# Patient Record
Sex: FEMALE | Race: WHITE | Employment: FULL TIME | URBAN - METROPOLITAN AREA
[De-identification: names, ages, dates, MRNs, and addresses within clinical notes are randomized per-mention and may not be internally consistent; named-entity substitution may affect disease eponyms.]

---

## 2018-12-13 ENCOUNTER — HOSPITAL ENCOUNTER (OUTPATIENT)
Dept: OCCUPATIONAL MEDICINE | Age: 38
Discharge: HOME | End: 2018-12-13

## 2018-12-13 DIAGNOSIS — T14.90XA INJURY: ICD-10-CM

## 2019-03-06 RX ORDER — SODIUM CHLORIDE 0.9 % (FLUSH) 0.9 %
5-40 SYRINGE (ML) INJECTION AS NEEDED
Status: CANCELLED | OUTPATIENT
Start: 2019-03-06

## 2019-03-06 RX ORDER — SODIUM CHLORIDE 0.9 % (FLUSH) 0.9 %
5-40 SYRINGE (ML) INJECTION EVERY 8 HOURS
Status: CANCELLED | OUTPATIENT
Start: 2019-03-06

## 2019-03-11 ENCOUNTER — ANESTHESIA EVENT (OUTPATIENT)
Dept: SURGERY | Age: 39
End: 2019-03-11
Payer: OTHER MISCELLANEOUS

## 2019-03-11 NOTE — H&P
CC: Pain of the right wrist    HPI:   The patient is a 43year old woman who works at [employer]. She has been seen through [facility]. The patient has had problems with the right elbow and wrist since around 12/1/18. The MRI scan shows some degenerative tearing of the triangular fibrocartilage complex and she has tenderness around the ulnar aspect of the wrist.  Her LT ligament was intact. She also had some degenerative change at the scapholunate ligament but no complete tear. She has had numbness and tingling into the ring and little fingers. Her previous nerve conduction studies show cubital tunnel syndrome. She is apparently being scheduled for a second opinion as I had recommended doing the cubital tunnel release and injecting the wrist.  She would like to have just the wrist injected today and see if that will relieve her pain. Procedure:  After an Avagard and alcohol prep, the ulnocarpal region of the left wrist was injected with 0.5 cc of 1% Lidocaine and 6 mg of Celestone uneventfully. The patient is going to be seen back again after her second opinion or if her pain recurs. I still recommend that she go ahead with the cubital tunnel release. She is going to continue with her current work restrictions.     WAB/kgw

## 2019-03-12 ENCOUNTER — ANESTHESIA (OUTPATIENT)
Dept: SURGERY | Age: 39
End: 2019-03-12
Payer: OTHER MISCELLANEOUS

## 2019-03-12 ENCOUNTER — HOSPITAL ENCOUNTER (OUTPATIENT)
Age: 39
Setting detail: OUTPATIENT SURGERY
Discharge: HOME | End: 2019-03-12
Attending: ORTHOPAEDIC SURGERY | Admitting: ORTHOPAEDIC SURGERY
Payer: OTHER MISCELLANEOUS

## 2019-03-12 VITALS
SYSTOLIC BLOOD PRESSURE: 118 MMHG | BODY MASS INDEX: 32.61 KG/M2 | RESPIRATION RATE: 16 BRPM | WEIGHT: 190 LBS | HEART RATE: 72 BPM | OXYGEN SATURATION: 100 % | TEMPERATURE: 98 F | DIASTOLIC BLOOD PRESSURE: 85 MMHG

## 2019-03-12 DIAGNOSIS — G89.18 POST-OP PAIN: Primary | ICD-10-CM

## 2019-03-12 PROCEDURE — 77030000032 HC CUF TRNQT ZIMM -B: Performed by: ORTHOPAEDIC SURGERY

## 2019-03-12 PROCEDURE — 77030002933 HC SUT MCRYL J&J -A: Performed by: ORTHOPAEDIC SURGERY

## 2019-03-12 PROCEDURE — 74011250636 HC RX REV CODE- 250/636: Performed by: ANESTHESIOLOGY

## 2019-03-12 PROCEDURE — 77030018836 HC SOL IRR NACL ICUM -A: Performed by: ORTHOPAEDIC SURGERY

## 2019-03-12 PROCEDURE — 74011000250 HC RX REV CODE- 250: Performed by: ORTHOPAEDIC SURGERY

## 2019-03-12 PROCEDURE — 76210000020 HC REC RM PH II FIRST 0.5 HR: Performed by: ORTHOPAEDIC SURGERY

## 2019-03-12 PROCEDURE — 74011250636 HC RX REV CODE- 250/636

## 2019-03-12 PROCEDURE — 76210000063 HC OR PH I REC FIRST 0.5 HR: Performed by: ORTHOPAEDIC SURGERY

## 2019-03-12 PROCEDURE — 74011250636 HC RX REV CODE- 250/636: Performed by: ORTHOPAEDIC SURGERY

## 2019-03-12 PROCEDURE — 76060000032 HC ANESTHESIA 0.5 TO 1 HR: Performed by: ORTHOPAEDIC SURGERY

## 2019-03-12 PROCEDURE — 76010000160 HC OR TIME 0.5 TO 1 HR INTENSV-TIER 1: Performed by: ORTHOPAEDIC SURGERY

## 2019-03-12 RX ORDER — LIDOCAINE HYDROCHLORIDE AND EPINEPHRINE 5; 5 MG/ML; UG/ML
INJECTION, SOLUTION INFILTRATION; PERINEURAL AS NEEDED
Status: DISCONTINUED | OUTPATIENT
Start: 2019-03-12 | End: 2019-03-12 | Stop reason: HOSPADM

## 2019-03-12 RX ORDER — LIDOCAINE HYDROCHLORIDE 20 MG/ML
INJECTION, SOLUTION EPIDURAL; INFILTRATION; INTRACAUDAL; PERINEURAL AS NEEDED
Status: DISCONTINUED | OUTPATIENT
Start: 2019-03-12 | End: 2019-03-12 | Stop reason: HOSPADM

## 2019-03-12 RX ORDER — FLUMAZENIL 0.1 MG/ML
0.2 INJECTION INTRAVENOUS AS NEEDED
Status: DISCONTINUED | OUTPATIENT
Start: 2019-03-12 | End: 2019-03-12 | Stop reason: HOSPADM

## 2019-03-12 RX ORDER — HYDROMORPHONE HYDROCHLORIDE 2 MG/ML
0.5 INJECTION, SOLUTION INTRAMUSCULAR; INTRAVENOUS; SUBCUTANEOUS
Status: DISCONTINUED | OUTPATIENT
Start: 2019-03-12 | End: 2019-03-12 | Stop reason: HOSPADM

## 2019-03-12 RX ORDER — NALOXONE HYDROCHLORIDE 0.4 MG/ML
0.1 INJECTION, SOLUTION INTRAMUSCULAR; INTRAVENOUS; SUBCUTANEOUS
Status: DISCONTINUED | OUTPATIENT
Start: 2019-03-12 | End: 2019-03-12 | Stop reason: HOSPADM

## 2019-03-12 RX ORDER — DIPHENHYDRAMINE HYDROCHLORIDE 50 MG/ML
12.5 INJECTION, SOLUTION INTRAMUSCULAR; INTRAVENOUS
Status: DISCONTINUED | OUTPATIENT
Start: 2019-03-12 | End: 2019-03-12 | Stop reason: HOSPADM

## 2019-03-12 RX ORDER — PROPOFOL 10 MG/ML
INJECTION, EMULSION INTRAVENOUS
Status: DISCONTINUED | OUTPATIENT
Start: 2019-03-12 | End: 2019-03-12 | Stop reason: HOSPADM

## 2019-03-12 RX ORDER — SODIUM CHLORIDE, SODIUM LACTATE, POTASSIUM CHLORIDE, CALCIUM CHLORIDE 600; 310; 30; 20 MG/100ML; MG/100ML; MG/100ML; MG/100ML
75 INJECTION, SOLUTION INTRAVENOUS CONTINUOUS
Status: DISCONTINUED | OUTPATIENT
Start: 2019-03-12 | End: 2019-03-12 | Stop reason: HOSPADM

## 2019-03-12 RX ORDER — LIDOCAINE HYDROCHLORIDE 5 MG/ML
INJECTION, SOLUTION INFILTRATION; INTRAVENOUS
Status: COMPLETED | OUTPATIENT
Start: 2019-03-12 | End: 2019-03-12

## 2019-03-12 RX ORDER — LIDOCAINE HYDROCHLORIDE 10 MG/ML
0.1 INJECTION INFILTRATION; PERINEURAL AS NEEDED
Status: DISCONTINUED | OUTPATIENT
Start: 2019-03-12 | End: 2019-03-12 | Stop reason: HOSPADM

## 2019-03-12 RX ORDER — SODIUM CHLORIDE 0.9 % (FLUSH) 0.9 %
5-40 SYRINGE (ML) INJECTION EVERY 8 HOURS
Status: DISCONTINUED | OUTPATIENT
Start: 2019-03-12 | End: 2019-03-12 | Stop reason: HOSPADM

## 2019-03-12 RX ORDER — SODIUM CHLORIDE 0.9 % (FLUSH) 0.9 %
5-40 SYRINGE (ML) INJECTION AS NEEDED
Status: DISCONTINUED | OUTPATIENT
Start: 2019-03-12 | End: 2019-03-12 | Stop reason: HOSPADM

## 2019-03-12 RX ORDER — HYDROCODONE BITARTRATE AND ACETAMINOPHEN 7.5; 325 MG/1; MG/1
1 TABLET ORAL
Qty: 15 TAB | Refills: 0 | Status: SHIPPED | OUTPATIENT
Start: 2019-03-12 | End: 2019-03-15

## 2019-03-12 RX ORDER — OXYCODONE HYDROCHLORIDE 5 MG/1
5 TABLET ORAL
Status: DISCONTINUED | OUTPATIENT
Start: 2019-03-12 | End: 2019-03-12 | Stop reason: HOSPADM

## 2019-03-12 RX ORDER — OXYCODONE HYDROCHLORIDE 5 MG/1
10 TABLET ORAL
Status: DISCONTINUED | OUTPATIENT
Start: 2019-03-12 | End: 2019-03-12 | Stop reason: HOSPADM

## 2019-03-12 RX ORDER — PROPOFOL 10 MG/ML
INJECTION, EMULSION INTRAVENOUS AS NEEDED
Status: DISCONTINUED | OUTPATIENT
Start: 2019-03-12 | End: 2019-03-12 | Stop reason: HOSPADM

## 2019-03-12 RX ORDER — MIDAZOLAM HYDROCHLORIDE 1 MG/ML
2 INJECTION, SOLUTION INTRAMUSCULAR; INTRAVENOUS
Status: COMPLETED | OUTPATIENT
Start: 2019-03-12 | End: 2019-03-12

## 2019-03-12 RX ORDER — CEFAZOLIN SODIUM/WATER 2 G/20 ML
2 SYRINGE (ML) INTRAVENOUS ONCE
Status: COMPLETED | OUTPATIENT
Start: 2019-03-12 | End: 2019-03-12

## 2019-03-12 RX ADMIN — SODIUM CHLORIDE, SODIUM LACTATE, POTASSIUM CHLORIDE, AND CALCIUM CHLORIDE 75 ML/HR: 600; 310; 30; 20 INJECTION, SOLUTION INTRAVENOUS at 10:30

## 2019-03-12 RX ADMIN — PROPOFOL 40 MG: 10 INJECTION, EMULSION INTRAVENOUS at 14:11

## 2019-03-12 RX ADMIN — PROPOFOL 140 MCG/KG/MIN: 10 INJECTION, EMULSION INTRAVENOUS at 14:00

## 2019-03-12 RX ADMIN — SODIUM CHLORIDE, SODIUM LACTATE, POTASSIUM CHLORIDE, AND CALCIUM CHLORIDE: 600; 310; 30; 20 INJECTION, SOLUTION INTRAVENOUS at 14:34

## 2019-03-12 RX ADMIN — LIDOCAINE HYDROCHLORIDE 60 MG: 20 INJECTION, SOLUTION EPIDURAL; INFILTRATION; INTRACAUDAL; PERINEURAL at 14:00

## 2019-03-12 RX ADMIN — LIDOCAINE HYDROCHLORIDE 50 ML: 5 INJECTION, SOLUTION INFILTRATION; INTRAVENOUS at 14:04

## 2019-03-12 RX ADMIN — PROPOFOL 50 MG: 10 INJECTION, EMULSION INTRAVENOUS at 14:00

## 2019-03-12 RX ADMIN — Medication 2 G: at 14:13

## 2019-03-12 RX ADMIN — MIDAZOLAM HYDROCHLORIDE 2 MG: 2 INJECTION, SOLUTION INTRAMUSCULAR; INTRAVENOUS at 11:39

## 2019-03-12 NOTE — DISCHARGE INSTRUCTIONS
POST OP INSTRUCTIONS      1. Patient has appointment for 3/22/19 at 9:20 AM at the [facility]. 2.  For problems call Dr Brown Harper, [facility],  [phone]          Appointments only,  [phone]    3. Ice and elevate the surgical site. 4.  Leave dressings on and wash in running water or shower. Do not submerge in bath or dish water. DIET  · Clear liquids until no nausea or vomiting; then light diet for the first day. · Advance to regular diet on second day, unless your doctor orders otherwise. · If nausea and vomiting continues, call your doctor. PAIN  · Take pain medication as directed by your doctor. · Call your doctor if pain is NOT relieved by medication. · DO NOT take aspirin of blood thinners unless directed by your doctor. CALL YOUR DOCTOR IF   · Excessive bleeding that does not stop after holding pressure over the area  · Temperature of 101 degrees F or above  · Excessive redness, swelling or bruising, and/ or green or yellow, smelly discharge from incision    AFTER ANESTHESIA   · For the first 24 hours: DO NOT Drive, Drink alcoholic beverages, or Make important decisions. · Be aware of dizziness following anesthesia and while taking pain medication. After general anesthesia or intravenous sedation, for 24 hours or while taking prescription Narcotics:  · Limit your activities  · Do not drive and operate hazardous machinery  · Do not make important personal or business decisions  · Do  not drink alcoholic beverages  · If you have not urinated within 8 hours after discharge, please contact your surgeon on call. *  Please give a list of your current medications to your Primary Care Provider. *  Please update this list whenever your medications are discontinued, doses are      changed, or new medications (including over-the-counter products) are added.     *  Please carry medication information at all times in case of emergency situations. These are general instructions for a healthy lifestyle:    No smoking/ No tobacco products/ Avoid exposure to second hand smoke    Surgeon General's Warning:  Quitting smoking now greatly reduces serious risk to your health. Obesity, smoking, and sedentary lifestyle greatly increases your risk for illness    A healthy diet, regular physical exercise & weight monitoring are important for maintaining a healthy lifestyle    You may be retaining fluid if you have a history of heart failure or if you experience any of the following symptoms:  Weight gain of 3 pounds or more overnight or 5 pounds in a week, increased swelling in our hands or feet or shortness of breath while lying flat in bed. Please call your doctor as soon as you notice any of these symptoms; do not wait until your next office visit. Recognize signs and symptoms of STROKE:    F-face looks uneven    A-arms unable to move or move unevenly    S-speech slurred or non-existent    T-time-call 911 as soon as signs and symptoms begin-DO NOT go       Back to bed or wait to see if you get better-TIME IS BRAIN.

## 2019-03-12 NOTE — ANESTHESIA PREPROCEDURE EVALUATION
Anesthetic History   No history of anesthetic complications            Review of Systems / Medical History  Patient summary reviewed and pertinent labs reviewed    Pulmonary  Within defined limits                 Neuro/Psych         Psychiatric history (no meds)     Cardiovascular                  Exercise tolerance: >4 METS     GI/Hepatic/Renal     GERD: well controlled           Endo/Other        Obesity     Other Findings              Physical Exam    Airway  Mallampati: I  TM Distance: > 6 cm  Neck ROM: normal range of motion   Mouth opening: Normal     Cardiovascular    Rhythm: regular  Rate: normal         Dental  No notable dental hx       Pulmonary  Breath sounds clear to auscultation               Abdominal         Other Findings            Anesthetic Plan    ASA: 2  Anesthesia type: regional - Thom block            Anesthetic plan and risks discussed with: Patient and Mother

## 2019-03-12 NOTE — INTERVAL H&P NOTE
H&P Update:  Joi Green was seen and examined. Pt is alert and oriented. Chest: Clear w/o SOB. C/V:  RR&R    History and physical has been reviewed. The patient has been examined. There have been no significant clinical changes since the completion of the originally dated History and Physical.  Patient identified by surgeon; surgical site was confirmed by patient and surgeon. The patient is to have a right cubital tunnel release.     Signed By: Noemy Chapa MD     March 12, 2019 1:39 PM

## 2019-03-12 NOTE — ANESTHESIA PROCEDURE NOTES
Peripheral Block    Start time: 3/12/2019 2:04 PM  End time: 3/12/2019 2:07 PM  Performed by: Michelle Bustillos CRNA  Authorized by: Tessa Hyatt MD       Pre-procedure:    Indications: primary anesthetic    Preanesthetic Checklist: patient identified, risks and benefits discussed, site marked, timeout performed, anesthesia consent given and patient being monitored    Timeout Time: 14:02          Block Type:   Block Type:  Thom block  Laterality:  Right  Patient Position:  Supine  Block Limb IV Checked: Yes    Esmarch exsanguination: Yes    Tourniquet Type:  Single  Tourniquet Location:  Above elbow  Tourniquet Inflated:  3/12/2019 2:04 PM  Inflation (mmHg):  250  Limb w/o Radial Pulse: Yes    Infused Agent:  Lidocaine 0.5%  Volume Infused (mL):  50    Assessment:    Injection Assessment:   Patient tolerance:  Patient tolerated the procedure well with no immediate complications

## 2019-03-12 NOTE — ANESTHESIA POSTPROCEDURE EVALUATION
Procedure(s):  ELBOW CUBITAL TUNNEL RELEASE RIGHT. Anesthesia Post Evaluation      Multimodal analgesia: multimodal analgesia used between 6 hours prior to anesthesia start to PACU discharge  Patient location during evaluation: PACU  Patient participation: complete - patient participated  Level of consciousness: awake  Pain management: adequate  Airway patency: patent  Anesthetic complications: no  Cardiovascular status: acceptable and hemodynamically stable  Respiratory status: acceptable  Hydration status: acceptable  Comments: Acceptable for discharge from PACU.         Visit Vitals  /84   Pulse 73   Temp 36.9 °C (98.5 °F)   Resp 16   Wt 86.2 kg (190 lb)   SpO2 100%   BMI 32.61 kg/m²

## 2019-03-12 NOTE — OP NOTES
Cubital Tunnel Release    [facility]       3/12/2019      Indications: This is a 35 yrs female  who presents with right cubital tunnel syndrome. The patient was admitted for surgery as conservative measures have failed. Pre-operative Diagnosis:  RIGHT CUBITAL TUNNEL SYNDROME    Post-operative Diagnosis:   RIGHT CUBITAL TUNNEL SYNDROME    Procedure: Right Cubital Tunnel Release - Ulnar Nerve at the Elbow    Surgeon: JENNIFER Wynne. Anesthesia:   IV Regional    Procedure/Operative Note:  After appropriate informed consent was obtained, the patient was taken to the operating suite and placed in a supine position on the operating room table with the right arm outstretched. Preop prophylactic IV antibiotics were given. Anesthesia was instituted. All pressure points were carefully padded. The right upper extremity was prepped and draped in the usual sterile fashion. A timeout was completed and once confirmed by the operative team we proceeded. A posteromedial incision was made posterior to the medial epicondyle and carried down through the subcutaneous tissues. Small bleeders were electrocoagulated. The ulnar nerve was identified. Carefull disection was used to release the nerve at the epicondyle then it was further released both proximally and distally by spreading the sub Q tissue and the releasing the nerve with the scissors . The nerve was released for approximately 8-9 cm proximally as well as down into the pronator flexor fascia and muscle. No tendency for anterior subluxation was noted. The margins of the wound were injected with 0.5% Lidocaine with epinephrine. The subcutaneous tissues were closed with inverted 4-0 Monocryl stitches and then SteriStrips were applied with Mastisol adhesive. Sterile dressings of gauze and tegaderm were applied. The patient tolerated the procedure well and was sent to the  in good condition. Signed By: Ernie Amor. Karoline Wynne.

## 2019-09-25 RX ORDER — SODIUM CHLORIDE 0.9 % (FLUSH) 0.9 %
5-40 SYRINGE (ML) INJECTION AS NEEDED
Status: CANCELLED | OUTPATIENT
Start: 2019-09-25

## 2019-09-25 RX ORDER — SODIUM CHLORIDE 0.9 % (FLUSH) 0.9 %
5-40 SYRINGE (ML) INJECTION EVERY 8 HOURS
Status: CANCELLED | OUTPATIENT
Start: 2019-09-25

## 2019-09-27 NOTE — H&P
Date of Service: 2019-09-09  Work Status:  ????? Allergies:Bactrim(rash); Rocephin(rash)  Medications:Protonix; Vitamin C      CC: Pain of the right shoulder and wrist    HPI:  The patient is a 74-year-old woman who works at [employer]. She has had problems now for about 9 months or longer since around the end of 12/2018. She has pain around the right wrist which is the most bothersome to her. She gets 2 or 3 months relief with injections there, but continues with recurring pain once the injections wear off. She has also had problems with the right shoulder and neck. She has recently had an MRI of her neck to rule out any radicular-type problems. She has apparently been told by her employer that she is going to be terminated because it has been 26 weeks or longer since she has been having problems around the light duty. She has decided that she wants to go ahead with surgery on the right wrist which we had discussed previously. PE:  On exam today, the patient has good range of motion of the right wrist with some tenderness around the foveal region of the distal ulna and some tenderness also of the extensor carpi ulnaris. She has some pain with ulnar deviation as well as extension of the wrist and deviation ulnarward. There is minimal or no pain beyond the radial aspect of the wrist and dorsal and radial.  The DRUJ seems stable. Her neck and shoulder region show good range of motion, but tenderness in the trapezius muscle posteriorly. She has good circulation and sensation in the extremity. MRI SCAN:  The patient had an MRI of the cervical spine done at [facility] on 08/12/2019. Review of the written report from the radiologist shows that the patient had no compressive disc herniation or foraminal stenosis. There was central protrusion of C6-7 without any contact or pressure on the anterior cord or associated canal stenosis.   No other specific abnormalities were noted. DISPOSITION:  The problem was discussed with the patient as well as her , Trina Cortes, from [facility]. The patient has decided to go ahead with surgery on the right wrist.  I went ahead and got an x-ray today that shows her to have a positive ulnar variance. I would recommend that she have ulnar shortening osteotomy and open repair or debridement of the TFCC and extensor carpi ulnaris tendon. She could do this as an outpatient under an axillary block anesthetic. We will go ahead and seek authorization and schedule her for that. I will need some Spyromite or Macromite anchors. I would prefer PEEK anchors so that they can be ultimately MRI'ed  if necessary later on.

## 2019-10-01 ENCOUNTER — ANESTHESIA EVENT (OUTPATIENT)
Dept: SURGERY | Age: 39
End: 2019-10-01
Payer: OTHER MISCELLANEOUS

## 2019-10-02 ENCOUNTER — ANESTHESIA (OUTPATIENT)
Dept: SURGERY | Age: 39
End: 2019-10-02
Payer: OTHER MISCELLANEOUS

## 2019-10-02 ENCOUNTER — HOSPITAL ENCOUNTER (OUTPATIENT)
Age: 39
Setting detail: OUTPATIENT SURGERY
Discharge: HOME | End: 2019-10-02
Attending: ORTHOPAEDIC SURGERY | Admitting: ORTHOPAEDIC SURGERY
Payer: OTHER MISCELLANEOUS

## 2019-10-02 ENCOUNTER — APPOINTMENT (OUTPATIENT)
Dept: GENERAL RADIOLOGY | Age: 39
End: 2019-10-02
Attending: ORTHOPAEDIC SURGERY
Payer: OTHER MISCELLANEOUS

## 2019-10-02 VITALS
OXYGEN SATURATION: 99 % | RESPIRATION RATE: 17 BRPM | TEMPERATURE: 97.6 F | DIASTOLIC BLOOD PRESSURE: 82 MMHG | SYSTOLIC BLOOD PRESSURE: 132 MMHG | BODY MASS INDEX: 35.07 KG/M2 | WEIGHT: 198 LBS | HEART RATE: 69 BPM

## 2019-10-02 DIAGNOSIS — G89.18 POST-OP PAIN: Primary | ICD-10-CM

## 2019-10-02 PROCEDURE — 74011250636 HC RX REV CODE- 250/636: Performed by: ANESTHESIOLOGY

## 2019-10-02 PROCEDURE — 74011000250 HC RX REV CODE- 250

## 2019-10-02 PROCEDURE — 74011250637 HC RX REV CODE- 250/637: Performed by: ANESTHESIOLOGY

## 2019-10-02 PROCEDURE — 74011250636 HC RX REV CODE- 250/636: Performed by: ORTHOPAEDIC SURGERY

## 2019-10-02 PROCEDURE — 77030020263 HC SOL INJ SOD CL0.9% LFCR 1000ML: Performed by: ORTHOPAEDIC SURGERY

## 2019-10-02 PROCEDURE — 76060000034 HC ANESTHESIA 1.5 TO 2 HR: Performed by: ORTHOPAEDIC SURGERY

## 2019-10-02 PROCEDURE — 77030003900 HC BIT DRL TWST TRIM -C: Performed by: ORTHOPAEDIC SURGERY

## 2019-10-02 PROCEDURE — 76942 ECHO GUIDE FOR BIOPSY: CPT | Performed by: ORTHOPAEDIC SURGERY

## 2019-10-02 PROCEDURE — 76210000020 HC REC RM PH II FIRST 0.5 HR: Performed by: ORTHOPAEDIC SURGERY

## 2019-10-02 PROCEDURE — 76010000162 HC OR TIME 1.5 TO 2 HR INTENSV-TIER 1: Performed by: ORTHOPAEDIC SURGERY

## 2019-10-02 PROCEDURE — 77030027352: Performed by: ORTHOPAEDIC SURGERY

## 2019-10-02 PROCEDURE — 77030018836 HC SOL IRR NACL ICUM -A: Performed by: ORTHOPAEDIC SURGERY

## 2019-10-02 PROCEDURE — 77030020275 HC MISC ORTHOPEDIC: Performed by: ORTHOPAEDIC SURGERY

## 2019-10-02 PROCEDURE — A4565 SLINGS: HCPCS | Performed by: ORTHOPAEDIC SURGERY

## 2019-10-02 PROCEDURE — C1713 ANCHOR/SCREW BN/BN,TIS/BN: HCPCS | Performed by: ORTHOPAEDIC SURGERY

## 2019-10-02 PROCEDURE — 76210000063 HC OR PH I REC FIRST 0.5 HR: Performed by: ORTHOPAEDIC SURGERY

## 2019-10-02 PROCEDURE — 74011250636 HC RX REV CODE- 250/636

## 2019-10-02 PROCEDURE — 77030002933 HC SUT MCRYL J&J -A: Performed by: ORTHOPAEDIC SURGERY

## 2019-10-02 PROCEDURE — 77030003602 HC NDL NRV BLK BBMI -B: Performed by: ANESTHESIOLOGY

## 2019-10-02 PROCEDURE — 77030002966 HC SUT PDS J&J -A: Performed by: ORTHOPAEDIC SURGERY

## 2019-10-02 PROCEDURE — 77030033681 HC SPLNT P-CUT SAF BSNM -A: Performed by: ORTHOPAEDIC SURGERY

## 2019-10-02 PROCEDURE — 77030000032 HC CUF TRNQT ZIMM -B: Performed by: ORTHOPAEDIC SURGERY

## 2019-10-02 PROCEDURE — 76010010054 HC POST OP PAIN BLOCK: Performed by: ORTHOPAEDIC SURGERY

## 2019-10-02 PROCEDURE — 77030008298 HC SPLNT FBRGLS SCTCH 3M -A: Performed by: ORTHOPAEDIC SURGERY

## 2019-10-02 PROCEDURE — 77030031189 HC WRE K TRIM -A: Performed by: ORTHOPAEDIC SURGERY

## 2019-10-02 PROCEDURE — 77030033269 HC SLV COMPR SCD KNE2 CARD -B: Performed by: ORTHOPAEDIC SURGERY

## 2019-10-02 DEVICE — SCR BNE CORT HEX 3.2X14MM --: Type: IMPLANTABLE DEVICE | Site: WRIST | Status: FUNCTIONAL

## 2019-10-02 DEVICE — SCR BNE CORT HEX 3.2X12MM --: Type: IMPLANTABLE DEVICE | Site: WRIST | Status: FUNCTIONAL

## 2019-10-02 DEVICE — DYNOMITE 2.0 MM PEEK WITH 1                                    ULTRABRAID SUTURE SIZES 2-0 AND NEEDLES
Type: IMPLANTABLE DEVICE | Site: WRIST | Status: FUNCTIONAL
Brand: DYNOMITE

## 2019-10-02 DEVICE — IMPLANTABLE DEVICE: Type: IMPLANTABLE DEVICE | Site: WRIST | Status: FUNCTIONAL

## 2019-10-02 RX ORDER — CEFAZOLIN SODIUM/WATER 2 G/20 ML
2 SYRINGE (ML) INTRAVENOUS ONCE
Status: COMPLETED | OUTPATIENT
Start: 2019-10-02 | End: 2019-10-02

## 2019-10-02 RX ORDER — PROPOFOL 10 MG/ML
INJECTION, EMULSION INTRAVENOUS
Status: DISCONTINUED | OUTPATIENT
Start: 2019-10-02 | End: 2019-10-02 | Stop reason: HOSPADM

## 2019-10-02 RX ORDER — SODIUM CHLORIDE 0.9 % (FLUSH) 0.9 %
5-40 SYRINGE (ML) INJECTION AS NEEDED
Status: DISCONTINUED | OUTPATIENT
Start: 2019-10-02 | End: 2019-10-02 | Stop reason: HOSPADM

## 2019-10-02 RX ORDER — SODIUM CHLORIDE, SODIUM LACTATE, POTASSIUM CHLORIDE, CALCIUM CHLORIDE 600; 310; 30; 20 MG/100ML; MG/100ML; MG/100ML; MG/100ML
75 INJECTION, SOLUTION INTRAVENOUS CONTINUOUS
Status: DISCONTINUED | OUTPATIENT
Start: 2019-10-02 | End: 2019-10-02 | Stop reason: HOSPADM

## 2019-10-02 RX ORDER — HYDROMORPHONE HYDROCHLORIDE 2 MG/ML
0.5 INJECTION, SOLUTION INTRAMUSCULAR; INTRAVENOUS; SUBCUTANEOUS
Status: DISCONTINUED | OUTPATIENT
Start: 2019-10-02 | End: 2019-10-02 | Stop reason: HOSPADM

## 2019-10-02 RX ORDER — FAMOTIDINE 20 MG/1
20 TABLET, FILM COATED ORAL ONCE
Status: COMPLETED | OUTPATIENT
Start: 2019-10-02 | End: 2019-10-02

## 2019-10-02 RX ORDER — LIDOCAINE HYDROCHLORIDE 20 MG/ML
INJECTION, SOLUTION EPIDURAL; INFILTRATION; INTRACAUDAL; PERINEURAL
Status: COMPLETED | OUTPATIENT
Start: 2019-10-02 | End: 2019-10-02

## 2019-10-02 RX ORDER — ONDANSETRON 8 MG/1
8 TABLET, ORALLY DISINTEGRATING ORAL
Qty: 12 TAB | Refills: 1 | Status: SHIPPED | OUTPATIENT
Start: 2019-10-02

## 2019-10-02 RX ORDER — PROPOFOL 10 MG/ML
INJECTION, EMULSION INTRAVENOUS AS NEEDED
Status: DISCONTINUED | OUTPATIENT
Start: 2019-10-02 | End: 2019-10-02 | Stop reason: HOSPADM

## 2019-10-02 RX ORDER — OXYCODONE HYDROCHLORIDE 5 MG/1
5 TABLET ORAL
Status: DISCONTINUED | OUTPATIENT
Start: 2019-10-02 | End: 2019-10-02 | Stop reason: HOSPADM

## 2019-10-02 RX ORDER — DIPHENHYDRAMINE HYDROCHLORIDE 50 MG/ML
12.5 INJECTION, SOLUTION INTRAMUSCULAR; INTRAVENOUS ONCE
Status: DISCONTINUED | OUTPATIENT
Start: 2019-10-02 | End: 2019-10-02 | Stop reason: HOSPADM

## 2019-10-02 RX ORDER — SODIUM CHLORIDE 0.9 % (FLUSH) 0.9 %
5-40 SYRINGE (ML) INJECTION EVERY 8 HOURS
Status: DISCONTINUED | OUTPATIENT
Start: 2019-10-02 | End: 2019-10-02 | Stop reason: HOSPADM

## 2019-10-02 RX ORDER — MIDAZOLAM HYDROCHLORIDE 1 MG/ML
2 INJECTION, SOLUTION INTRAMUSCULAR; INTRAVENOUS ONCE
Status: COMPLETED | OUTPATIENT
Start: 2019-10-02 | End: 2019-10-02

## 2019-10-02 RX ORDER — SODIUM CHLORIDE 9 MG/ML
50 INJECTION, SOLUTION INTRAVENOUS CONTINUOUS
Status: DISCONTINUED | OUTPATIENT
Start: 2019-10-02 | End: 2019-10-02 | Stop reason: HOSPADM

## 2019-10-02 RX ORDER — OXYCODONE AND ACETAMINOPHEN 5; 325 MG/1; MG/1
1 TABLET ORAL AS NEEDED
Status: DISCONTINUED | OUTPATIENT
Start: 2019-10-02 | End: 2019-10-02 | Stop reason: HOSPADM

## 2019-10-02 RX ORDER — HYDROMORPHONE HYDROCHLORIDE 2 MG/1
2-4 TABLET ORAL
Qty: 25 TAB | Refills: 0 | Status: SHIPPED | OUTPATIENT
Start: 2019-10-02 | End: 2019-10-06

## 2019-10-02 RX ORDER — LIDOCAINE HYDROCHLORIDE 10 MG/ML
0.1 INJECTION INFILTRATION; PERINEURAL AS NEEDED
Status: DISCONTINUED | OUTPATIENT
Start: 2019-10-02 | End: 2019-10-02 | Stop reason: HOSPADM

## 2019-10-02 RX ORDER — BUPIVACAINE HYDROCHLORIDE AND EPINEPHRINE 2.5; 5 MG/ML; UG/ML
INJECTION, SOLUTION EPIDURAL; INFILTRATION; INTRACAUDAL; PERINEURAL
Status: COMPLETED | OUTPATIENT
Start: 2019-10-02 | End: 2019-10-02

## 2019-10-02 RX ORDER — SODIUM CHLORIDE, SODIUM LACTATE, POTASSIUM CHLORIDE, CALCIUM CHLORIDE 600; 310; 30; 20 MG/100ML; MG/100ML; MG/100ML; MG/100ML
100 INJECTION, SOLUTION INTRAVENOUS CONTINUOUS
Status: DISCONTINUED | OUTPATIENT
Start: 2019-10-02 | End: 2019-10-02 | Stop reason: HOSPADM

## 2019-10-02 RX ORDER — BUPIVACAINE HYDROCHLORIDE AND EPINEPHRINE 5; 5 MG/ML; UG/ML
INJECTION, SOLUTION EPIDURAL; INTRACAUDAL; PERINEURAL
Status: COMPLETED | OUTPATIENT
Start: 2019-10-02 | End: 2019-10-02

## 2019-10-02 RX ORDER — ACETAMINOPHEN 500 MG
1000 TABLET ORAL
COMMUNITY

## 2019-10-02 RX ORDER — CEFAZOLIN SODIUM/WATER 2 G/20 ML
2 SYRINGE (ML) INTRAVENOUS ONCE
Status: CANCELLED | OUTPATIENT
Start: 2019-10-02 | End: 2019-10-02

## 2019-10-02 RX ORDER — MIDAZOLAM HYDROCHLORIDE 1 MG/ML
2 INJECTION, SOLUTION INTRAMUSCULAR; INTRAVENOUS
Status: DISCONTINUED | OUTPATIENT
Start: 2019-10-02 | End: 2019-10-02 | Stop reason: HOSPADM

## 2019-10-02 RX ORDER — FENTANYL CITRATE 50 UG/ML
25 INJECTION, SOLUTION INTRAMUSCULAR; INTRAVENOUS ONCE
Status: COMPLETED | OUTPATIENT
Start: 2019-10-02 | End: 2019-10-02

## 2019-10-02 RX ADMIN — BUPIVACAINE HYDROCHLORIDE AND EPINEPHRINE 20 ML: 2.5; 5 INJECTION, SOLUTION EPIDURAL; INFILTRATION; INTRACAUDAL; PERINEURAL at 08:40

## 2019-10-02 RX ADMIN — PROPOFOL 20 MG: 10 INJECTION, EMULSION INTRAVENOUS at 09:57

## 2019-10-02 RX ADMIN — Medication 2 G: at 09:55

## 2019-10-02 RX ADMIN — BUPIVACAINE HYDROCHLORIDE AND EPINEPHRINE 20 ML: 5; 5 INJECTION, SOLUTION EPIDURAL; INTRACAUDAL; PERINEURAL at 08:40

## 2019-10-02 RX ADMIN — SODIUM CHLORIDE, SODIUM LACTATE, POTASSIUM CHLORIDE, AND CALCIUM CHLORIDE: 600; 310; 30; 20 INJECTION, SOLUTION INTRAVENOUS at 09:45

## 2019-10-02 RX ADMIN — MIDAZOLAM 2 MG: 1 INJECTION INTRAMUSCULAR; INTRAVENOUS at 08:30

## 2019-10-02 RX ADMIN — FAMOTIDINE 20 MG: 20 TABLET ORAL at 08:00

## 2019-10-02 RX ADMIN — PROPOFOL 160 MCG/KG/MIN: 10 INJECTION, EMULSION INTRAVENOUS at 09:51

## 2019-10-02 RX ADMIN — PROPOFOL 20 MG: 10 INJECTION, EMULSION INTRAVENOUS at 08:30

## 2019-10-02 RX ADMIN — SODIUM CHLORIDE, SODIUM LACTATE, POTASSIUM CHLORIDE, AND CALCIUM CHLORIDE 1000 ML: 600; 310; 30; 20 INJECTION, SOLUTION INTRAVENOUS at 08:43

## 2019-10-02 RX ADMIN — PROPOFOL 20 MG: 10 INJECTION, EMULSION INTRAVENOUS at 09:55

## 2019-10-02 RX ADMIN — LIDOCAINE HYDROCHLORIDE 10 ML: 20 INJECTION, SOLUTION EPIDURAL; INFILTRATION; INTRACAUDAL; PERINEURAL at 08:40

## 2019-10-02 RX ADMIN — FENTANYL CITRATE 25 MCG: 50 INJECTION INTRAMUSCULAR; INTRAVENOUS at 08:30

## 2019-10-02 RX ADMIN — PROPOFOL 40 MG: 10 INJECTION, EMULSION INTRAVENOUS at 09:51

## 2019-10-02 RX ADMIN — PROPOFOL 20 MG: 10 INJECTION, EMULSION INTRAVENOUS at 09:53

## 2019-10-02 NOTE — INTERVAL H&P NOTE
H&P Update: 
Ramon Salamanca was seen and examined. Pt is alert and oriented. Chest: Clear w/o SOB. C/V:  RR&R History and physical has been reviewed. The patient has been examined. There have been no significant clinical changes since the completion of the originally dated History and Physical. 
Patient identified by surgeon; surgical site was confirmed by patient and surgeon.  
The pt is here for a Right ulnar shortening osteotomy and repair of the ECU and TFCC of the wrist.

## 2019-10-02 NOTE — ANESTHESIA PROCEDURE NOTES
Peripheral Block    Start time: 10/2/2019 8:30 AM  End time: 10/2/2019 8:40 AM  Performed by: Ever Borja MD  Authorized by: Ever Borja MD       Pre-procedure: Indications: at surgeon's request and post-op pain management    Preanesthetic Checklist: patient identified, risks and benefits discussed, site marked, timeout performed, anesthesia consent given and patient being monitored    Timeout Time: 08:30          Block Type:   Block Type:  Axillary  Laterality:  Right  Monitoring:  Standard ASA monitoring, continuous pulse ox, frequent vital sign checks, heart rate, oxygen and responsive to questions  Injection Technique:  Single shot  Procedures: ultrasound guided and nerve stimulator    Prep: chlorhexidine    Location:  Axilla  Needle Type:  Stimuplex  Needle Gauge:  22 G  Needle Localization:  Nerve stimulator and ultrasound guidance  Motor Response: minimal motor response >0.4 mA      Assessment:  Number of attempts:  1  Injection Assessment:  Local visualized surrounding nerve on ultrasound, negative aspiration for blood, no paresthesia, no intravascular symptoms, ultrasound image on chart and incremental injection every 5 mL  Patient tolerance:  Patient tolerated the procedure well with no immediate complications  Note: Accessory artery in axilla with abnormal placement of nerve bundles. All three nerves were stimulated individually to assure placement.

## 2019-10-02 NOTE — BRIEF OP NOTE
BRIEF OPERATIVE NOTE    Date of Procedure: 10/2/2019   Preoperative Diagnosis: TFCC (triangular fibrocartilage complex) tear, right, initial encounter [R16.445R]  Postoperative Diagnosis: TFCC (triangular fibrocartilage complex) tear, right, initial encounter [Q82.498X]    Procedure(s):  RIGHT WRIST ULNAR SHORTENING OSTEOTOMY  RIGHT OPEN REPAIR TFCC AND ECU TENDON/ AXILARY  Surgeon(s) and Role:     * Jocy Sheriff MD - Primary         Surgical Assistant: none    Surgical Staff:  Circ-1: Gianni Verma RN  Scrub Tech-1: Donnie Enamorado  Event Time In Time Out   Incision Start 7345    Incision Close 1114      Anesthesia: Regional   Estimated Blood Loss: minimal  Specimens: * No specimens in log *   Findings: fraying of the ECU tendon   Complications: none  Implants:   Implant Name Type Inv.  Item Serial No.  Lot No. LRB No. Used Action   SCR BNE BARRY HEX 3.2X12MM --  - [id number]  SCR BNE BARRY HEX 3.2X12MM --   [employer] [id number] Right 4 Implanted   PLATE COMPR OSTEOTMY ULNAR 7H --  - [id number]  PLATE COMPR OSTEOTMY ULNAR 7H --   [employer] [id number] Right 1 Implanted   SCR BNE BARRY HEX 3.2X14MM --  - [id number]  SCR BNE BARRY HEX 3.2X14MM --   [employer] [id number] Right 2 Implanted   3.2 MM LAG SCREW    [employer] [id number] Right 1 Implanted   ANCHOR SUT DYNOMITE 2.0MM --  - [id number]  ANCHOR SUT DYNOMITE 2.0MM --   [employer] [id number] Right 1 Implanted

## 2019-10-02 NOTE — ANESTHESIA PREPROCEDURE EVALUATION
Relevant Problems   No relevant active problems       Anesthetic History   No history of anesthetic complications            Review of Systems / Medical History  Patient summary reviewed and pertinent labs reviewed    Pulmonary          Smoker (ex)         Neuro/Psych              Cardiovascular                  Exercise tolerance: >4 METS     GI/Hepatic/Renal     GERD: well controlled      PUD     Endo/Other        Obesity     Other Findings   Comments: anxiety         Physical Exam    Airway  Mallampati: II  TM Distance: 4 - 6 cm  Neck ROM: normal range of motion   Mouth opening: Normal     Cardiovascular  Regular rate and rhythm,  S1 and S2 normal,  no murmur, click, rub, or gallop  Rhythm: regular  Rate: normal         Dental  No notable dental hx       Pulmonary  Breath sounds clear to auscultation               Abdominal  Abdominal exam normal       Other Findings            Anesthetic Plan    ASA: 2  Anesthesia type: total IV anesthesia      Post-op pain plan if not by surgeon: peripheral nerve block single    Induction: Intravenous  Anesthetic plan and risks discussed with: Patient

## 2019-10-02 NOTE — INTERVAL H&P NOTE
H&P Update: 
Alexandria Pillai was seen and examined. Pt is alert and oriented. Chest: Clear w/o SOB. C/V:  RR&R History and physical has been reviewed. The patient has been examined. There have been no significant clinical changes since the completion of the originally dated History and Physical. 
Patient identified by surgeon; surgical site was confirmed by patient and surgeon. The patient is scheduled for surgery on the left wrist for an ulnar shortening osteotomy and ECU and TFCC repair.

## 2019-10-02 NOTE — ANESTHESIA POSTPROCEDURE EVALUATION
Procedure(s):  RIGHT WRIST ULNAR SHORTENING OSTEOTOMY  RIGHT OPEN REPAIR TFCC AND ECU TENDON/ AXILARY. total IV anesthesia    Anesthesia Post Evaluation      Multimodal analgesia: multimodal analgesia used between 6 hours prior to anesthesia start to PACU discharge  Patient location during evaluation: bedside  Patient participation: complete - patient participated  Level of consciousness: awake  Pain score: 0  Pain management: adequate  Airway patency: patent  Anesthetic complications: no  Cardiovascular status: acceptable and stable  Respiratory status: acceptable and room air  Hydration status: acceptable  Post anesthesia nausea and vomiting:  none      Vitals Value Taken Time   /89 10/2/2019 11:33 AM   Temp 36.4 °C (97.6 °F) 10/2/2019 11:25 AM   Pulse 68 10/2/2019 11:35 AM   Resp 16 10/2/2019 11:25 AM   SpO2 100 % 10/2/2019 11:35 AM   Vitals shown include unvalidated device data.

## 2019-10-02 NOTE — DISCHARGE INSTRUCTIONS
POST OP INSTRUCTIONS      1. Patient has appointment for 10/11/19 at 9:20 AM at the [address] [place] [address]. 2.  For problems call Dr Deborah Bates, [facility],  [phone]          Appointments only,  [phone]    3. Ice and elevate the surgical site. 4.  Keep splints and dressing dry and intact. 5.  If able to tolerate, take   Acetaminophen 325 mg  2 every 4 hrs   And                                               Ibuprofen 200 mg   3 every 6 hrs   OR   Aleve 220 mg 2 every 12 hrs to help with pain                                                   ( Do not take Ibuprofen or Aleve if taking any other anti inflamatory drug, NSAID, or anti arthritis drug or if taking blood thinners.)                                                 Vitamin C   500 mg  Once a day for 2 months. ACTIVITY      Ice and elevate the surgical site. Keep splints and dressing dry and intact. DIET  · Clear liquids until no nausea or vomiting; then light diet for the first day. · Advance to regular diet on second day, unless your doctor orders otherwise. · If nausea and vomiting continues, call your doctor. · Avoid greasy and spicy food today to reduce nausea. PAIN  Begin taking pills when sensation returns or at dinnertime /bedtime even if still numb  · Take pain medication as directed by your doctor. · Call your doctor if pain is NOT relieved by medication. · DO NOT take aspirin of blood thinners unless directed by your doctor. · Take pain pills with food to reduce nausea  · Pain pills may cause constipation. May use stool softener    DRESSING CARE Patient Education        Wearing a Fiberglass Cast: Care Instructions  Your Care Instructions    A cast protects a broken bone or other injury while it heals. Most casts are made of fiberglass. After a cast is put on, you can't remove it yourself. Your doctor or a technician will take it off.   Follow-up care is a key part of your treatment and safety. Be sure to make and go to all appointments, and call your doctor if you are having problems. It's also a good idea to know your test results and keep a list of the medicines you take. How can you care for yourself at home? General care  · Follow your doctor's instructions for when you can start using the limb that has the cast. Fiberglass casts dry quickly and are soon hard enough to protect the injured arm or leg. · When it's okay to put weight on your leg or foot cast, don't stand or walk on it unless it's designed for walking. · Prop up the injured arm or leg on a pillow anytime you sit or lie down during the first 3 days. Try to keep it above the level of your heart. This will help reduce swelling. · Put ice or a cold pack on your cast for 10 to 20 minutes at a time. Try to do this every 1 to 2 hours for the next 3 days (when you are awake). Put a thin cloth between the ice and your cast. Keep the cast dry. · Be safe with medicines. Read and follow all instructions on the label. ? If the doctor gave you a prescription medicine for pain, take it as prescribed. ? If you are not taking a prescription pain medicine, ask your doctor if you can take an over-the-counter medicine. · Do exercises as instructed by your doctor or physical therapist. These exercises will help keep your muscles strong and your joints flexible while you heal.  · Wiggle your fingers or toes on the injured arm or leg often. This helps reduce swelling and stiffness. Water and your cast  · Try to keep your cast as dry as you can. The fiberglass part of your cast can get wet. But getting the inside wet can cause problems. · Use a bag or tape a sheet of plastic to cover your cast when you take a shower or bath or when you have any other contact with water. (Don't take a bath unless you can keep the cast out of the water.) Moisture can collect under the cast and cause skin irritation and itching.  It can make infection more likely if you have had surgery or have a wound under the cast.  · If you have a water-resistant cast, ask your doctor how often it can get wet and how to take care of it. Cast and skin care  · Try blowing cool air from a hair dryer or fan into the cast to help relieve itching. Never stick items under your cast to scratch the skin. · Don't use oils or lotions near your cast. If the skin gets red or irritated around the edge of the cast, you may pad the edges with a soft material or use tape to cover them. When should you call for help? Call your doctor now or seek immediate medical care if:    · You have increased or severe pain.     · You feel a warm or painful spot under the cast.     · You have problems with your cast. For example:  ? The skin under the cast burns or stings. ? The cast feels too tight or too loose. ? There is a lot of swelling near the cast. (Some swelling is normal.)  ? You have a new fever. ? There is drainage or a bad smell coming from the cast.     · Your foot or hand is cool or pale or changes color.     · You have trouble moving your fingers or toes.     · You have symptoms of a blood clot in your arm or leg (called a deep vein thrombosis). These may include:  ? Pain in the arm, calf, back of the knee, thigh, or groin. ? Redness and swelling in the arm, leg, or groin.    Watch closely for changes in your health, and be sure to contact your doctor if:    · The cast is breaking apart.     · You are not getting better as expected. Where can you learn more? Go to http://ivana-sarwat.info/. Enter 454 [id number] in the search box to learn more about \"Wearing a Fiberglass Cast: Care Instructions. \"  Current as of: June 26, 2019  Content Version: 12.2  © 2066-4793 [employer]. Care instructions adapted under license by [employer] (which disclaims liability or warranty for this information).  If you have questions about a medical condition or this instruction, always ask your healthcare professional. [place] [address] any warranty or liability for your use of this information. CALL YOUR DOCTOR IF   · Excessive bleeding that does not stop after holding pressure over the area  · Temperature of 101 degrees F or above  · Excessive redness, swelling or bruising, and/ or green or yellow, smelly discharge from incision    AFTER ANESTHESIA   · For the first 24 hours: DO NOT Drive, Drink alcoholic beverages, or Make important decisions. · Be aware of dizziness following anesthesia and while taking pain medication. APPOINTMENT DATE/ TIME 10/11/19 at 9:20 AM at the [address] [place] [address]. YOUR DOCTOR'S PHONE NUMBER [phone]      DISCHARGE SUMMARY from Nurse    PATIENT INSTRUCTIONS:    After general anesthesia or intravenous sedation, for 24 hours or while taking prescription Narcotics:  · Limit your activities  · Do not drive and operate hazardous machinery  · Do not make important personal or business decisions  · Do  not drink alcoholic beverages  · If you have not urinated within 8 hours after discharge, please contact your surgeon on call. *  Please give a list of your current medications to your Primary Care Provider. *  Please update this list whenever your medications are discontinued, doses are      changed, or new medications (including over-the-counter products) are added. *  Please carry medication information at all times in case of emergency situations. These are general instructions for a healthy lifestyle:    No smoking/ No tobacco products/ Avoid exposure to second hand smoke    Surgeon General's Warning:  Quitting smoking now greatly reduces serious risk to your health.     Obesity, smoking, and sedentary lifestyle greatly increases your risk for illness    A healthy diet, regular physical exercise & weight monitoring are important for maintaining a healthy lifestyle    You may be retaining fluid if you have a history of heart failure or if you experience any of the following symptoms:  Weight gain of 3 pounds or more overnight or 5 pounds in a week, increased swelling in our hands or feet or shortness of breath while lying flat in bed. Please call your doctor as soon as you notice any of these symptoms; do not wait until your next office visit. Recognize signs and symptoms of STROKE:    F-face looks uneven    A-arms unable to move or move unevenly    S-speech slurred or non-existent    T-time-call 911 as soon as signs and symptoms begin-DO NOT go       Back to bed or wait to see if you get better-TIME IS BRAIN.

## 2019-10-02 NOTE — OP NOTES
[facility]  OPERATIVE REPORT    Name:  Marline Motley  MR#:  [id number]  :  1980  ACCOUNT #:  [de-identified]  DATE OF SERVICE:  10/02/2019    PREOPERATIVE DIAGNOSES:  1. Ulnar abutment syndrome, right wrist.  2.  Tear of the triangular fibrocartilage complex, right wrist.  3.  Right extensor carpi ulnaris tendonitis. POSTOPERATIVE DIAGNOSES:  1. Ulnar abutment syndrome, right wrist.  2.  Tear of the triangular fibrocartilage complex, right wrist.  3.  Right extensor carpi ulnaris tendonitis. PROCEDURES PERFORMED:  1. Right ulnar shortening osteotomy. 2.  Repair of the right triangular fibrocartilage complex. 3.  Release of the right extensor carpi ulnaris and repair of ECU sheath. SURGEON:  Sagar Dupont MD    ASSISTANT:  None. ANESTHESIA:  Brachial plexus block. COMPLICATIONS:  None. SPECIMENS REMOVED:  Tissue removed for histologic exam, none. IMPLANTS:  TriMed ulnar shortening plate and screws and one Smith [employer]hew 2.0 mm PEEK Dynomite anchor. ESTIMATED BLOOD LOSS:  Minimal.    PROCEDURE:  The patient was given IV antibiotics preoperatively and an axillary block for anesthesia. She was taken to the operating room and positioned in the supine position with the right upper extremity outstretched on arm board. The arm was prepped with ChloraPrep and draped as a sterile field. A time-out was held identifying the patient, surgeon, procedure and operative site. The arm was exsanguinated with an Esmarch bandage and a pneumatic tourniquet inflated to 250 mmHg around the upper arm. A longitudinal incision was made along the ulnar border at the junction of mid and distal thirds of the ulnar shaft. This was carried down through the skin and subcutaneous tissue. Small bleeders were electrocoagulated. Care was taken to watch for the sensory branch of the ulnar nerve in the distal part of the incision, but it was not noted there.   The interval between the ECU muscle and the FCU muscle was opened down to the ulnar aspect of the bone. This was then reflected back off of the ulnar aspect with periosteal elevators. Hohmann retractors were used for better visualization along with other self-retaining retractors as needed. The TriMed  ulnar shortening osteotomy system was utilized following the protocol that they recommend. The plate was positioned on the volar aspect of the ulna, temporarily clamped in the midportion with clamp. The slotted screw had a single screw placed in the most proximal aspect of the slot tightened securely. The three distal screws were then placed using one non-locking screw and two locking screws. The clamp was removed from the plate at that time. The cutting guide was applied after putting two 1.6-mm K-wires through the volar cortex of the radius using the guide for those. The cutting guide to remove or shorten the ulna by 4 mm was attached and held in place with a clamp. The initial cut was made obliquely through the guide with an oscillating saw. Continuous irrigation with saline was done using the 20 mL syringe and blunt-tipped needle. Cutting was done slowly with little pressure to try to avoid heat build up. Once the cut was completed, the A guide was removed and replaced with the B cutting guide. It was clamped in place and again secondary cut was made. Once this was completed, the small ring of bone was removed. The osteotomy was then compressed using the compression clamp. One prong of the clamp was placed into the hole in the side of the plate and the second affixed to the ulnar shaft proximal to the osteotomy, placing this dorsal to the midline of the shaft. The screw and the slotted hole was loosened a fourth of a turn and then the compression clamp tightened snugly compressing the osteotomy. There was excellent coaptation or fit of the two cut surfaces.   The oblique drill guide was then attached over the K-wires and an oblique drill hole made across the osteotomy site and a partially threaded screw inserted and tightened giving a lag effect and further compressing the osteotomy. Additional two screws proximally were then placed using locking screws. The position was checked with the image intensifier. There was noted to be negative ulnar variance of a millimeter or two and good alignment. The osteotomy itself was not visible on the images. The wound was thoroughly irrigated with saline. The muscle fascia was closed with running stitch of 2-0 PDS. Subcutaneous tissues were closed with inverted sutures of 4-0 Monocryl. Turning to the dorsal ulnar aspect of the wrist, a chevron-shaped incision was made over the distal ulna. This was carried down through the skin and subcutaneous tissues. Flap of the extensor retinaculum was reflected back and in the ECU sheath opened along the dorsal ulnar aspect. It was noted that there was some longitudinal splitting or fraying of the ECU tendon. This was trimmed a bit smoothing it well. The area at the distal ulna was then opened just dorsal to the ulnar styloid and there was noted to be some fraying or tearing of the TFCC. A Marr and Nephew Dynomite PEEK anchor was utilized. This was 2 mm and loaded with 2-0 Ultrabraid suture. These were used to tighten the TFCC placing one stitch above and below the split that had been made in the ulnar ligament complex. The wound was irrigated. The ECU was allowed to fall back into its position and the sheath sutured around it. It did not seem to sublux. The subcutaneous tissues were closed with inverted sutures of 4-0 Monocryl. The wounds were cleaned with a wet sponge and dried. Mastisol adhesive was placed around the incisions followed by Steri-Strips to reinforce the closure. Sterile dressings of gauze and Webril were applied followed by a 3 inch wide sugar-tong fiberglass splint held in place with Ace wraps.   The patient tolerated the procedure well and was sent to recovery room in good condition.             Ildefonso Wise MD      [id number]  D:  10/02/2019 11:41  T:  10/02/2019 12:36  JOB #:  [id number]  CC:  [address] [place] [address]

## 2022-04-06 NOTE — PROGRESS NOTES
Gunner Shin  : 1980  Primary: [employer]  Secondary:  [address] at [facility]  [address] [place] [address]  [phone]   [phone]        OUTPATIENT PHYSICAL THERAPY: Daily Treatment Note 2022  ICD-10: Treatment Diagnosis: Lack of coordination (muscle incoordination) (R27.8), Pelvic floor dysfunction in female (M62.98), Generalized weakness (M62.81), Urge incontinence (N39.41) and Full incontinence of feces (Fecal incontinence NOS) (R15.9)  Precautions/Allergies:   Ceftriaxone and Sulfamethoxazole-trimethoprim   TREATMENT PLAN:  Effective Dates: 2022 TO 2022 (90 days). Frequency/Duration: 1 time a week for 90 Day(s) MEDICAL/REFERRING DIAGNOSIS:  PFD (pelvic floor dysfunction) [M62.89]  Full incontinence of feces [R15.9]   DATE OF ONSET: chronic  REFERRING PHYSICIAN: Ray Ortega MD Orders: evaluate and treat  Return MD Appointment: 2022     Pre-treatment Symptoms/Complaints:  see evaluation  Pain: Initial: Pain Intensity 1: 0  Post Session:  0/10   Medications Last Reviewed:  2022   Updated Objective Findings:  See evaluation note from today   TREATMENT:   THERAPEUTIC EXERCISE: (10 minutes):  Exercises per grid below to improve mobility, strength, and coordination. Required minimal visual, verbal, and manual cues to promote proper body alignment, promote proper body posture, and promote proper body mechanics. Progressed resistance, range, and repetitions as indicated. All exercises performed with emphasis on kegel and breathing in order improve coordination, awareness and to minimize symptoms. Date:  22 Date:   Date:     Activity/Exercise Parameters Parameters Parameters   Patient Education Discussed HEP and POC     Anton feliciano reviewed     drops Reviewed and given handout                                Pt gives verbal consent to internal  assessment/treatment no chaperon present.      NEURO REEDUCATION: () to improve control and coordination of pelvic floor     Date:   Date:   Date:     Activity/Exercise Parameters Parameters Parameters   Biofeedback With sEMG was utilized for coordination of PFM. MANUAL THERAPY: () to improve soft tissue tone    Date Type Location Comments   4/7/2022 Internal assessment/treatment                                           (Used abbreviations: MET - muscle energy technique; SCS- Strain counter strain; CTM-Connective tissue mobilizations; CR- Contract/relax; SP- Sustained pressure, TrP-Trigger point release, IASTM- Instrument assisted soft tissue mobilizations, TDN-Trigger point dry needling)      [facility]     Treatment/Session Summary:  Pt reports good understanding of plan of care, as well as prescribed home exercise program.  All questions were answered to pt's satisfaction. Pt was invited to call with any further questions or concerns. · Response to Treatment:  see evaluation. · Communication/Consultation:  None today  · Equipment provided today:  None today  · Recommendations/Intent for next treatment session: Next visit will focus on biofeedback. · Variation from POC: N/A  Total Treatment Billable Duration:  10 minutes  PT Patient Time In/Time Out  Time In: 1100  Time Out: 210 [facility]    No future appointments.

## 2022-04-06 NOTE — THERAPY EVALUATION
Pauline Medina  : 1980  Primary: [employer] [place] [employer]  Secondary:  [address]  at [address]  [phone]   [phone]        OUTPATIENT PHYSICAL THERAPY:Initial Assessment 2022   ICD-10: Treatment Diagnosis: Lack of coordination (muscle incoordination) (R27.8), Pelvic floor dysfunction in female (M62.98), Generalized weakness (M62.81), Urge incontinence (N39.41) and Full incontinence of feces (Fecal incontinence NOS) (R15.9)  Precautions/Allergies:   Ceftriaxone and Sulfamethoxazole-trimethoprim   TREATMENT PLAN:  Effective Dates: 2022 TO 2022 (90 days). Frequency/Duration: 1 time a week for 90 Day(s) MEDICAL/REFERRING DIAGNOSIS:  PFD (pelvic floor dysfunction) [M62.89]  Full incontinence of feces [R15.9]   DATE OF ONSET: chronic  REFERRING PHYSICIAN: Anatoliy Johnson,*  MD Orders: evaluate and treat  Return MD Appointment: 2022     INITIAL ASSESSMENT: Pauline Medina presents with musculoskeletal limitations including pelvic floor muscle (PFM) tension, reduced PFM Range of Motion (ROM), increased tenderness to palpation of the PFM, reduced coordination and awareness of PFM and decreased pelvic floor muscle (PFM) strength. These limitations are impairing the patient's ability to properly coordinate perineal elevation and descent for normalized PFM function, contributing to voiding dysfunction. As a result, the patient is limited in her/his ability to participate in household chores, physical activities such as walking, swimming, or other exercise and social activities outside of the home. PROBLEM LIST (Impacting functional limitations):  Decreased Flexibility/Joint Mobility  Decreased [place] with Home Exercise Program  Decreased coordination  Decreased strength of pelvic floor which limits bladder control   INTERVENTIONS PLANNED:  1. Family Education  2. Home Exercise Program (HEP)  3.  Manual Therapy  4. Neuromuscular Re-education/Strengthening  5. Therapeutic Activites  6. Therapeutic Exercise/Strengthening     GOALS: (Goals have been discussed and agreed upon with patient.)  Short-Term Functional Goals: Time Frame:   1. Pt will demonstrate I with basic PFM HEP to improve awareness, coordination, and timing of PFM. 2. Patient will demonstrate understanding of and ability to teach back appropriate water and fiber intake, toileting frequency, and positioning for improved self-management of symptoms. 3. Patient will demonstrate ability to isolate a pelvic floor contraction without breath holding and minimal to no accessory muscle use in order to implement the knack and/or urge suppression  4. Patient will demonstrate ability to perform diaphragmatic breathing in multiple positions to assist in pelvic floor tension reduction. 5. Patient will verbalize understanding and demonstrate postural adjustments which facilitate lengthening and reduce overall pelvic floor muscle activity. Discharge Goals: Time Frame:   1. Patient will demonstrate independence with an advanced HEP for general conditioning, core stabilization, and mobility to facilitate carry over and independent management of symptoms. 2. Patient will be independent in a home dilator and/or graded exposure program, to be performed daily, in order to reduce pain and improved tolerance of tampon use, gynecological screening, and/or sexual intercourse.   3. Patient with demonstrate normal voluntary relaxation of the pelvic floor muscle group to improve pelvic floor ROM    OUTCOME MEASURE:   Tool Used: Pelvic Floor Impact Questionnaireshort form 7 (PFIQ-7)   Score:  Initial:   · Bladder or Urine: 52/100  · Bowel or Rectum: 95/100  · Vagina or Pelvis: 95/100 Most Recent: X (Date: -- )  · Bladder or Urine: X  · Bowel or Rectum: X  · Vagina or Pelvis: X   Interpretation of Score: Each of the 7 sections is scored on a scale from 0-3; 0 representing \"Not at all\", 3 representing \"Quite a bit\". The mean value is taken from all the answered items, then multiplied by 100 to obtain the scale score, ranging from 0-100. This process is repeated for each column representing bowel, bladder, and pelvic pain. Medical Necessity:   · Patient demonstrates GOOD rehab potential due to higher previous functional level. Reason for Services/Other Comments:  · Patient continues to require skilled intervention due to above mentioned deficits  . Total Duration:   PT Patient Time In/Time Out  Time In: 1100  Time Out: 1200    Rehabilitation Potential For Stated Goals: Good  Regarding Miri Burdick's therapy, I certify that the treatment plan above will be carried out by a therapist or under their direction. Thank you for this referral,  VIRGINIA Wu RaT     Referring Physician Signature: Gold Law,* _______________________________ Date _____________              PAIN/SUBJECTIVE:   Initial: Pain Intensity 1: 0  Post Session:  0/10     HISTORY:   History of Present Injury/Illness (Reason for Referral):  Ms. Robbie Doan is a 35 yo female referred to pelvic floor physical therapy (PFPT) by Gold Law,* 2/2 PFD and full incontinences of feces. Pt reports that symptoms began years ago. She had a bladder sling put in after her hysterectomy in 2017. She doesn't have much bladder incontinence right now unless she is super full of urine or is trying to go to the bathroom. It will be just a little bit. The bowel leakage has been going on for years. She has been seeing colorectal. Had a rectocele repair with the hysterectomy. She has always had IBS. She is dicyclomine and fiber. Mostly has diarreaha but has been put on steroids and antibiotic which has helped. Some days she can have loose bowels or normal bowels. Its here and there. The incontinence of bowels can be a smear to a total loss.   Contributing factors to IBS: stress, anxiety, depression and chronic pain. She has fibromyalgia, chronic neck/back pain, and CRPS. She goes to pain management. She has a referral for a psychiatrist. Dr. Tracie Chandler feels a high rectocele as per patient. She sometimes has to splint. She also noticed her sphincter didn't close all. Urinary: Frequency 8-10 x/day, 1-2 x/night. Positive for urge urinary incontinence, urinary urgency, urinary hesitancy/intermittency and nocturia. Negative for stress urinary incontinence, urinary frequency, incomplete emptying, dysuria, hematuria and nocturnal enuresis. Pt does use pads for urinary protection; 1-3 pads per day (PPD). Fluid intake: 90 oz water/day; bladder irritants include: diet soda. Pt does not limit fluid intake due to bladder control. Bowel: Frequency once a day to a couple times a day. Positive for pushing/straining with bowel movement, fecal incontinence, constipation and incomplete emptying. Negative for pain with bowel movement. Pt does use pads for bowel protection; 1-3 pads per day (PPD). Use of stool softeners or laxatives? YES, fiber 1 in morning and 1 at night, fibercon   Sexual: Pt is not sexually active   Pelvic Organ Prolapse/Pelvic Pain: Location: pelvis internally. OB History: Number of pregnancies: 2 Number of vaginal deliveries: 2 Number of c-sections: 0. She tore from front to back with her son (4th degree)    Past Medical History/Comorbidities:   Ms. Dwight Marie  has a past medical history of Anxiety, Costochondritis, GERD (gastroesophageal reflux disease), Obesity (BMI 30.0-34.9) (03/08/2019), and PUD (peptic ulcer disease) (2016). Ms. Dwight Marie  has a past surgical history that includes hx endoscopy (2016); hx tubal ligation; hx hysterectomy (2017); and hx orthopaedic (Right, 03/2019). Social History/Living Environment:      Have you ever had any pelvic trauma (orthopedic in nature, fall, MVA, etc.)? NO   Have you ever experienced any unwanted physical or sexual contact?  NO   Have you ever experienced any form of medical trauma (GYN, urological, GI, etc)? NO     Prior Level of Function/Work/Activity:  Prior level of function: independent  Occupation: not working   Exercise activities: hasn't done much do to knees hurting, anxiety with getting into pool due to incontinence     Personal Factors:          Sex:  female        Age:  36 y.o. Ambulatory/Rehab Services H2 Model Falls Risk Assessment    Risk Factors:       No Risk Factors Identified Ability to Rise from Chair:       (0)  Ability to rise in a single movement    Falls Prevention Plan:       No modifications necessary   Total: (5 or greater = High Risk): 0    ©2010 [facility] of [employer]. All Rights Reserved. [facility] Patent #[id number]. Federal Law prohibits the replication, distribution or use without written permission from [facility] [employer]     Current Medications:       Current Outpatient Medications:     acetaminophen (TYLENOL EXTRA STRENGTH) 500 mg tablet, Take 1,000 mg by mouth every six (6) hours as needed for Pain., Disp: , Rfl:     ondansetron (ZOFRAN ODT) 8 mg disintegrating tablet, Take 1 Tab by mouth every eight (8) hours as needed for Nausea., Disp: 12 Tab, Rfl: 1    pantoprazole (PROTONIX) 40 mg tablet, Take 40 mg by mouth every morning., Disp: , Rfl:     naproxen (NAPROSYN) 500 mg tablet, Take 500 mg by mouth every twelve (12) hours as needed. , Disp: , Rfl:     raNITIdine (ZANTAC) 150 mg tablet, Take 150 mg by mouth daily as needed. , Disp: , Rfl:     multivitamin (ONE A DAY) tablet, Take 1 Tab by mouth daily. , Disp: , Rfl:    Date Last Reviewed:  4/7/2022   Number of Personal Factors/Comorbidities that affect the Plan of Care: 1-2: MODERATE COMPLEXITY   EXAMINATION:   OBSERVATION:   External Observations:   Voluntary contraction: [] absent     [x] present   Involuntary contraction: [] absent     [x] present   Involuntary relaxation: [] absent     [x] present   Perineal descent at rest: [] absent     [x] present   Perineal descent with bearing: [] absent     [x] present    Pelvic Floor Muscle (PFM) Assessment:   Vaginal vault size: [] decreased     [] increased     [] WNL   Muscle volume: [] decreased     [x] WNL     [] Defect   PFM tension: [] decreased     [x] increased     [] WNL    Contraction ability:  Voluntary contraction: [] absent     [x] weak     [] moderate      [] strong  Voluntary relaxation: [] absent     [x] partial     [] complete   MMT: 2/5   PFM endurance: 1 seconds   Overflow: [] absent     [x] min     [] mod     [] severe / Compensatory mm groups include abdominals          Palpation: via rectal canal   Superficial Pelvic Floor Musculature (PFM): Tender? Intermediate PFM Tender? Deep PFM Tender? Superficial Transverse Perineal [] Right  [] Left  []DNT Deep Transverse Perineal [] Right  [] Left  []DNT Puborectalis [x] Right  [x] Left  []DNT   Ischiocavernosus [] Right  [] Left  []DNT Compressor Urethra [] Right  [] Left  []DNT Pubococcygeus [x] Right  [x] Left  []DNT   Bulbocavernosus [] Right  [] Left  []DNT   Ileococcygeus [x] Right  [x] Left  []DNT       Obturator Internus [] Right  [] Left  []DNT       Coccygeus [] Right  [] Left  []DNT          Body Structures Involved:  1. Muscles Body Functions Affected:  1. Genitourinary  2. Neuromusculoskeletal  3. Movement Related Activities and Participation Affected:  1. Self Care  2. Domestic Life  3. Interpersonal Interactions and Relationships  4.  Community, Social and [place]   Number of elements (examined above) that affect the Plan of Care: 3: MODERATE COMPLEXITY   CLINICAL PRESENTATION:   Presentation: Evolving clinical presentation with changing clinical characteristics: MODERATE COMPLEXITY   CLINICAL DECISION MAKING:   Use of outcome tool(s) and clinical judgement create a POC that gives a: Questionable prediction of patient's progress: MODERATE COMPLEXITY

## 2022-04-07 ENCOUNTER — HOSPITAL ENCOUNTER (OUTPATIENT)
Dept: PHYSICAL THERAPY | Age: 42
Discharge: HOME | End: 2022-04-07
Payer: MEDICAID

## 2022-04-07 PROCEDURE — 97110 THERAPEUTIC EXERCISES: CPT

## 2022-04-07 PROCEDURE — 97162 PT EVAL MOD COMPLEX 30 MIN: CPT

## 2022-04-07 PROCEDURE — 97161 PT EVAL LOW COMPLEX 20 MIN: CPT

## 2022-04-11 NOTE — PROGRESS NOTES
Aniket Zimmerman  : 1980  Primary:   Secondary:  [address] at [address]  [phone]   [phone]        OUTPATIENT PHYSICAL THERAPY: Daily Treatment Note 2022  ICD-10: Treatment Diagnosis: Lack of coordination (muscle incoordination) (R27.8), Pelvic floor dysfunction in female (M62.98), Generalized weakness (M62.81), Urge incontinence (N39.41) and Full incontinence of feces (Fecal incontinence NOS) (R15.9)  Precautions/Allergies:   Ceftriaxone and Sulfamethoxazole-trimethoprim   TREATMENT PLAN:  Effective Dates: 2022 TO 2022 (90 days). Frequency/Duration: 1 time a week for 90 Day(s) MEDICAL/REFERRING DIAGNOSIS:  PFD (pelvic floor dysfunction) [M62.89]  Full incontinence of feces [R15.9]   DATE OF ONSET: chronic  REFERRING PHYSICIAN: Carlin Vallejo MD Orders: evaluate and treat  Return MD Appointment: 2022     Pre-treatment Symptoms/Complaints:  Patient reports she is about the same. Pain: Initial:    Post Session:  0/10   Medications Last Reviewed:  2022   Updated Objective Findings:    Ms. Shira Randhawa is a 35 yo female referred to pelvic floor physical therapy (PFPT) by Carlin Vallejo,* 2/2 PFD and full incontinences of feces. Pt reports that symptoms began years ago. She had a bladder sling put in after her hysterectomy in 2017. She doesn't have much bladder incontinence right now unless she is super full of urine or is trying to go to the bathroom. It will be just a little bit. The bowel leakage has been going on for years. She has been seeing colorectal. Had a rectocele repair with the hysterectomy. She has always had IBS. She is dicyclomine and fiber. Mostly has diarreaha but has been put on steroids and antibiotic which has helped. Some days she can have loose bowels or normal bowels. Its here and there. The incontinence of bowels can be a smear to a total loss.   Contributing factors to IBS: stress, anxiety, depression and chronic pain. She has fibromyalgia, chronic neck/back pain, and CRPS. She goes to pain management. She has a referral for a psychiatrist. Dr. Yoandy Thornton feels a high rectocele as per patient. She sometimes has to splint. She also noticed her sphincter didn't close all.       Urinary: Frequency 8-10 x/day, 1-2 x/night. Positive for urge urinary incontinence, urinary urgency, urinary hesitancy/intermittency and nocturia. Negative for stress urinary incontinence, urinary frequency, incomplete emptying, dysuria, hematuria and nocturnal enuresis. Pt does use pads for urinary protection; 1-3 pads per day (PPD). Fluid intake: 90 oz water/day; bladder irritants include: diet soda. Pt does not limit fluid intake due to bladder control. Bowel: Frequency once a day to a couple times a day. Positive for pushing/straining with bowel movement, fecal incontinence, constipation and incomplete emptying. Negative for pain with bowel movement. Pt does use pads for bowel protection; 1-3 pads per day (PPD). Use of stool softeners or laxatives? YES, fiber 1 in morning and 1 at night, fibercon   Sexual: Pt is not sexually active   Pelvic Organ Prolapse/Pelvic Pain: Location: pelvis internally. OB History: Number of pregnancies: 2 Number of vaginal deliveries: 2 Number of c-sections: 0. She tore from front to back with her son (4th degree)  External Observations:  · Voluntary contraction: []? absent     [x]? present  · Involuntary contraction: []? absent     [x]? present  · Involuntary relaxation: []? absent     [x]? present  · Perineal descent at rest: []? absent     [x]? present  · Perineal descent with bearing: []? absent     [x]? present     Pelvic Floor Muscle (PFM) Assessment:  · Vaginal vault size: []? decreased     []? increased     []? WNL  · Muscle volume: []? decreased     [x]? WNL     []? Defect  · PFM tension: []? decreased     [x]? increased     []?  WNL     Contraction ability:  Voluntary contraction: []? absent     [x]? weak     []? moderate      []? strong  Voluntary relaxation: []? absent     [x]? partial     []? complete   MMT: 2/5   PFM endurance: 1 seconds   Overflow: []? absent     [x]? min     []? mod     []? severe / Compensatory mm groups include abdominals              Palpation: via rectal canal   Superficial Pelvic Floor Musculature (PFM): Tender? Intermediate PFM Tender? Deep PFM Tender? Superficial Transverse Perineal []? Right  []? Left  []? DNT Deep Transverse Perineal []? Right  []? Left  []? DNT Puborectalis [x]? Right  [x]? Left  []? DNT   Ischiocavernosus []? Right  []? Left  []? DNT Compressor Urethra []? Right  []? Left  []? DNT Pubococcygeus [x]? Right  [x]? Left  []? DNT   Bulbocavernosus []? Right  []? Left  []? DNT     Ileococcygeus [x]? Right  [x]? Left  []? DNT           Obturator Internus []? Right  []? Left  []? DNT           Coccygeus []? Right  []? Left  []? DNT           TREATMENT:   THERAPEUTIC EXERCISE: (15 minutes):  Exercises per grid below to improve mobility, strength, and coordination. Required minimal visual, verbal, and manual cues to promote proper body alignment, promote proper body posture, and promote proper body mechanics. Progressed resistance, range, and repetitions as indicated. All exercises performed with emphasis on kegel and breathing in order improve coordination, awareness and to minimize symptoms. Date:  4-7-22 Date:  4-12-22 Date:     Activity/Exercise Parameters Parameters Parameters   Patient Education Discussed HEP and POC     Squatty potty reviewed     drops Reviewed and given handout     Seated hip abduction  x20 purple band with PF    Seated hip adduction  x20 yellow ball with PF    Seated march  x20 B purple band with PF    Sit to stand  x8       Pt gives verbal consent to internal  assessment/treatment no chaperon present.      NEURO REEDUCATION: (30 minutes) to improve control and coordination of pelvic floor Date:  4-12-22 Date:   Date:     Activity/Exercise Parameters Parameters Parameters   Biofeedback With sEMG was utilized for coordination of PFM. Supine, sitting, standing                                           MANUAL THERAPY: () to improve soft tissue tone    Date Type Location Comments   4/12/2022 Internal assessment/treatment                                           (Used abbreviations: MET - muscle energy technique; SCS- Strain counter strain; CTM-Connective tissue mobilizations; CR- Contract/relax; SP- Sustained pressure, TrP-Trigger point release, IASTM- Instrument assisted soft tissue mobilizations, TDN-Trigger point dry needling)      [facility] Portal     Treatment/Session Summary:  Pt reports good understanding of plan of care, as well as prescribed home exercise program.  All questions were answered to pt's satisfaction. Pt was invited to call with any further questions or concerns. · Response to Treatment:  Patient requires verbal cues for relaxation of PFM in supine and standing. Otherwise able to coordinate PFM just not able to hold the contraction. · Communication/Consultation:  None today  · Equipment provided today:  None today  · Recommendations/Intent for next treatment session: Next visit will focus on stretches and coordination of PFM. · Variation from POC: N/A  Total Treatment Billable Duration:  10 minutes there ex, 30 minutes neuro.    PT Patient Time In/Time Out  Time In: 1400  Time Out: Grayson Garcia, DPT    Future Appointments   Date Time Provider Tiana Alarcon   4/21/2022  2:00 PM Trae Mas DPT [facility]   4/28/2022  1:00 PM Trae Mas DPT [facility]   5/5/2022 10:00 AM Trae Mas DPT [facility]   5/10/2022 11:00 AM Trae Mas DPT [facility]   5/19/2022 11:00 AM Trae Mas DPT [facility]

## 2022-04-12 ENCOUNTER — HOSPITAL ENCOUNTER (OUTPATIENT)
Dept: PHYSICAL THERAPY | Age: 42
Discharge: HOME | End: 2022-04-12
Payer: MEDICAID

## 2022-04-12 PROCEDURE — 97110 THERAPEUTIC EXERCISES: CPT

## 2022-04-12 PROCEDURE — 97112 NEUROMUSCULAR REEDUCATION: CPT

## 2022-04-20 NOTE — PROGRESS NOTES
Jael Wu  : 1980  Primary:   Secondary:  [address] at [address] [facility], [address] [place] [address]  [phone]   [phone]        OUTPATIENT PHYSICAL THERAPY: Daily Treatment Note 2022  ICD-10: Treatment Diagnosis: Lack of coordination (muscle incoordination) (R27.8), Pelvic floor dysfunction in female (M62.98), Generalized weakness (M62.81), Urge incontinence (N39.41) and Full incontinence of feces (Fecal incontinence NOS) (R15.9)  Precautions/Allergies:   Ceftriaxone and Sulfamethoxazole-trimethoprim   TREATMENT PLAN:  Effective Dates: 2022 TO 2022 (90 days). Frequency/Duration: 1 time a week for 90 Day(s) MEDICAL/REFERRING DIAGNOSIS:  PFD (pelvic floor dysfunction) [M62.89]  Full incontinence of feces [R15.9]   DATE OF ONSET: chronic  REFERRING PHYSICIAN: Lea Walton MD Orders: evaluate and treat  Return MD Appointment: 2022     Pre-treatment Symptoms/Complaints:  Patient reports her stomach started last night. Was having loose stools. Her anxiety is through the roof. Still having loose bowels today. Prior to last night pelvic floor has been about the same. Pain: Initial:    Post Session:  0/10   Medications Last Reviewed:  2022   Updated Objective Findings:    Ms. Giovani Chan is a 35 yo female referred to pelvic floor physical therapy (PFPT) by Lea Walton,* 2/2 PFD and full incontinences of feces. Pt reports that symptoms began years ago. She had a bladder sling put in after her hysterectomy in 2017. She doesn't have much bladder incontinence right now unless she is super full of urine or is trying to go to the bathroom. It will be just a little bit. The bowel leakage has been going on for years. She has been seeing colorectal. Had a rectocele repair with the hysterectomy. She has always had IBS. She is dicyclomine and fiber. Mostly has diarreaha but has been put on steroids and antibiotic which has helped. Some days she can have loose bowels or normal bowels. Its here and there. The incontinence of bowels can be a smear to a total loss. Contributing factors to IBS: stress, anxiety, depression and chronic pain. She has fibromyalgia, chronic neck/back pain, and CRPS. She goes to pain management. She has a referral for a psychiatrist. Dr. Presley Cameron feels a high rectocele as per patient. She sometimes has to splint. She also noticed her sphincter didn't close all.       Urinary: Frequency 8-10 x/day, 1-2 x/night. Positive for urge urinary incontinence, urinary urgency, urinary hesitancy/intermittency and nocturia. Negative for stress urinary incontinence, urinary frequency, incomplete emptying, dysuria, hematuria and nocturnal enuresis. Pt does use pads for urinary protection; 1-3 pads per day (PPD). Fluid intake: 90 oz water/day; bladder irritants include: diet soda. Pt does not limit fluid intake due to bladder control. Bowel: Frequency once a day to a couple times a day. Positive for pushing/straining with bowel movement, fecal incontinence, constipation and incomplete emptying. Negative for pain with bowel movement. Pt does use pads for bowel protection; 1-3 pads per day (PPD). Use of stool softeners or laxatives? YES, fiber 1 in morning and 1 at night, fibercon   Sexual: Pt is not sexually active   Pelvic Organ Prolapse/Pelvic Pain: Location: pelvis internally. OB History: Number of pregnancies: 2 Number of vaginal deliveries: 2 Number of c-sections: 0. She tore from front to back with her son (4th degree)  External Observations:  · Voluntary contraction: []? absent     [x]? present  · Involuntary contraction: []? absent     [x]? present  · Involuntary relaxation: []? absent     [x]? present  · Perineal descent at rest: []? absent     [x]? present  · Perineal descent with bearing: []? absent     [x]?  present     Pelvic Floor Muscle (PFM) Assessment:  · Vaginal vault size: []? decreased     []? increased []? WNL  · Muscle volume: []? decreased     [x]? WNL     []? Defect  · PFM tension: []? decreased     [x]? increased     []? WNL     Contraction ability:  Voluntary contraction: []? absent     [x]? weak     []? moderate      []? strong  Voluntary relaxation: []? absent     [x]? partial     []? complete   MMT: 2/5   PFM endurance: 1 seconds   Overflow: []? absent     [x]? min     []? mod     []? severe / Compensatory mm groups include abdominals              Palpation: via rectal canal   Superficial Pelvic Floor Musculature (PFM): Tender? Intermediate PFM Tender? Deep PFM Tender? Superficial Transverse Perineal []? Right  []? Left  []? DNT Deep Transverse Perineal []? Right  []? Left  []? DNT Puborectalis [x]? Right  [x]? Left  []? DNT   Ischiocavernosus []? Right  []? Left  []? DNT Compressor Urethra []? Right  []? Left  []? DNT Pubococcygeus [x]? Right  [x]? Left  []? DNT   Bulbocavernosus []? Right  []? Left  []? DNT     Ileococcygeus [x]? Right  [x]? Left  []? DNT           Obturator Internus []? Right  []? Left  []? DNT           Coccygeus []? Right  []? Left  []? DNT           TREATMENT:   THERAPEUTIC EXERCISE: (30 minutes):  Exercises per grid below to improve mobility, strength, and coordination. Required minimal visual, verbal, and manual cues to promote proper body alignment, promote proper body posture, and promote proper body mechanics. Progressed resistance, range, and repetitions as indicated. All exercises performed with emphasis on kegel and breathing in order improve coordination, awareness and to minimize symptoms.     Date:  4-7-22 Date:  4-12-22 Date:  4-21-22   Activity/Exercise Parameters Parameters Parameters   Patient Education Discussed HEP and POC     Anton feliciano reviewed     drops Reviewed and given handout     Seated hip abduction  x20 purple band with PF x20 purple band with PF   Seated hip adduction  x20 yellow ball with PF x20 yellow ball with PF   Seated march  x20 B purple band with PF x20 B purple band with PF   Sit to stand  x8 x20     sidestepping   x20 in place     Standing hip extension   x20 B                    Pt gives verbal consent to internal  assessment/treatment no chaperon present. NEURO REEDUCATION: (30 minutes) to improve control and coordination of pelvic floor     Date:  4-12-22 Date:   Date:     Activity/Exercise Parameters Parameters Parameters   Biofeedback With sEMG was utilized for coordination of PFM. Supine, sitting, standing                                           MANUAL THERAPY: () to improve soft tissue tone    Date Type Location Comments   4/21/2022 Internal assessment/treatment                                           (Used abbreviations: MET - muscle energy technique; SCS- Strain counter strain; CTM-Connective tissue mobilizations; CR- Contract/relax; SP- Sustained pressure, TrP-Trigger point release, IASTM- Instrument assisted soft tissue mobilizations, TDN-Trigger point dry needling)      [email or web address] Portal     Treatment/Session Summary:  Pt reports good understanding of plan of care, as well as prescribed home exercise program.  All questions were answered to pt's satisfaction. Pt was invited to call with any further questions or concerns. · Response to Treatment:  Patient was not feeling good today due to stomach issues. Able to tolerate the exercises without leakage. Will try to progress next visit. · Communication/Consultation:  None today  · Equipment provided today:  None today  · Recommendations/Intent for next treatment session: Next visit will focus on stretches and coordination of PFM. · Variation from POC: N/A  Total Treatment Billable Duration:  25 minutes there ex.    PT Patient Time In/Time Out  Time In: 1400  Time Out: 901 Wilmer Urias DPT    Future Appointments   Date Time Provider Tiana Alarcon   4/28/2022  1:00 PM Jomar Fang DPT [facility]   5/5/2022 10:00 AM Jomar Fang DPT [facility]   5/10/2022 11:00 AM Kyle Marroquin DPT [facility]   5/19/2022 11:00 AM [facility]

## 2022-04-21 ENCOUNTER — HOSPITAL ENCOUNTER (OUTPATIENT)
Dept: PHYSICAL THERAPY | Age: 42
Discharge: HOME | End: 2022-04-21
Payer: MEDICAID

## 2022-04-21 PROCEDURE — 97110 THERAPEUTIC EXERCISES: CPT

## 2022-04-27 NOTE — PROGRESS NOTES
Yogesh Hodges  : 1980  Primary:   Secondary:  [address] at [address]  [phone]   [phone]        OUTPATIENT PHYSICAL THERAPY: Daily Treatment Note 2022  ICD-10: Treatment Diagnosis: Lack of coordination (muscle incoordination) (R27.8), Pelvic floor dysfunction in female (M62.98), Generalized weakness (M62.81), Urge incontinence (N39.41) and Full incontinence of feces (Fecal incontinence NOS) (R15.9)  Precautions/Allergies:   Ceftriaxone and Sulfamethoxazole-trimethoprim   TREATMENT PLAN:  Effective Dates: 2022 TO 2022 (90 days). Frequency/Duration: 1 time a week for 90 Day(s) MEDICAL/REFERRING DIAGNOSIS:  PFD (pelvic floor dysfunction) [M62.89]  Full incontinence of feces [R15.9]   DATE OF ONSET: chronic  REFERRING PHYSICIAN: Sulema Masden MD Orders: evaluate and treat  Return MD Appointment: 2022     Pre-treatment Symptoms/Complaints:  Patient reports she is about the same. During her exercises she is able to hold a little bit longer without letting it go. She had loose stools over the weekend again. Hasn't happened since Saturday. She did see a psychiatrist and is going to change up her meds. Pain: Initial:    Post Session:  0/10   Medications Last Reviewed:  2022   Updated Objective Findings:    Ms. Deepak Guzman is a 37 yo female referred to pelvic floor physical therapy (PFPT) by Sulema Madsen,* 2/2 PFD and full incontinences of feces. Pt reports that symptoms began years ago. She had a bladder sling put in after her hysterectomy in 2017. She doesn't have much bladder incontinence right now unless she is super full of urine or is trying to go to the bathroom. It will be just a little bit. The bowel leakage has been going on for years. She has been seeing colorectal. Had a rectocele repair with the hysterectomy. She has always had IBS. She is dicyclomine and fiber.  Mostly has diarreaha but has been put on steroids and antibiotic which has helped. Some days she can have loose bowels or normal bowels. Its here and there. The incontinence of bowels can be a smear to a total loss. Contributing factors to IBS: stress, anxiety, depression and chronic pain. She has fibromyalgia, chronic neck/back pain, and CRPS. She goes to pain management. She has a referral for a psychiatrist. Dr. Colletta Dixon feels a high rectocele as per patient. She sometimes has to splint. She also noticed her sphincter didn't close all.       Urinary: Frequency 8-10 x/day, 1-2 x/night. Positive for urge urinary incontinence, urinary urgency, urinary hesitancy/intermittency and nocturia. Negative for stress urinary incontinence, urinary frequency, incomplete emptying, dysuria, hematuria and nocturnal enuresis. Pt does use pads for urinary protection; 1-3 pads per day (PPD). Fluid intake: 90 oz water/day; bladder irritants include: diet soda. Pt does not limit fluid intake due to bladder control. Bowel: Frequency once a day to a couple times a day. Positive for pushing/straining with bowel movement, fecal incontinence, constipation and incomplete emptying. Negative for pain with bowel movement. Pt does use pads for bowel protection; 1-3 pads per day (PPD). Use of stool softeners or laxatives? YES, fiber 1 in morning and 1 at night, fibercon   Sexual: Pt is not sexually active   Pelvic Organ Prolapse/Pelvic Pain: Location: pelvis internally. OB History: Number of pregnancies: 2 Number of vaginal deliveries: 2 Number of c-sections: 0. She tore from front to back with her son (4th degree)  External Observations:  · Voluntary contraction: []? absent     [x]? present  · Involuntary contraction: []? absent     [x]? present  · Involuntary relaxation: []? absent     [x]? present  · Perineal descent at rest: []? absent     [x]? present  · Perineal descent with bearing: []? absent     [x]?  present     Pelvic Floor Muscle (PFM) Assessment:  · Vaginal vault size: []? decreased     []? increased     []? WNL  · Muscle volume: []? decreased     [x]? WNL     []? Defect  · PFM tension: []? decreased     [x]? increased     []? WNL     Contraction ability:  Voluntary contraction: []? absent     [x]? weak     []? moderate      []? strong  Voluntary relaxation: []? absent     [x]? partial     []? complete   MMT: 2/5   PFM endurance: 1 seconds   Overflow: []? absent     [x]? min     []? mod     []? severe / Compensatory mm groups include abdominals              Palpation: via rectal canal   Superficial Pelvic Floor Musculature (PFM): Tender? Intermediate PFM Tender? Deep PFM Tender? Superficial Transverse Perineal []? Right  []? Left  []? DNT Deep Transverse Perineal []? Right  []? Left  []? DNT Puborectalis [x]? Right  [x]? Left  []? DNT   Ischiocavernosus []? Right  []? Left  []? DNT Compressor Urethra []? Right  []? Left  []? DNT Pubococcygeus [x]? Right  [x]? Left  []? DNT   Bulbocavernosus []? Right  []? Left  []? DNT     Ileococcygeus [x]? Right  [x]? Left  []? DNT           Obturator Internus []? Right  []? Left  []? DNT           Coccygeus []? Right  []? Left  []? DNT           TREATMENT:   THERAPEUTIC EXERCISE: (45 minutes):  Exercises per grid below to improve mobility, strength, and coordination. Required minimal visual, verbal, and manual cues to promote proper body alignment, promote proper body posture, and promote proper body mechanics. Progressed resistance, range, and repetitions as indicated. All exercises performed with emphasis on kegel and breathing in order improve coordination, awareness and to minimize symptoms.     Date:  4-7-22 Date:  4-12-22 Date:  4-21-22 Date:  4-28-22   Activity/Exercise Parameters Parameters Parameters    Patient Education Discussed HEP and POC      Squatty jodie reviewed      drops Reviewed and given handout      Seated hip abduction  x20 purple band with PF x20 purple band with PF    Seated hip adduction  x20 yellow ball with PF x20 yellow ball with PF    Seated march  x20 B purple band with PF x20 B purple band with PF x20 B purple band with PF   Sit to stand  x8 x20   x20   sidestepping   x20 in place   x20 in place     Standing hip extension   x20 B   x20 B   2nd position bridge    x20 yellow ball     clamshells    x20 L purple band      Pt gives verbal consent to internal  assessment/treatment no chaperon present. NEURO REEDUCATION: (30 minutes) to improve control and coordination of pelvic floor     Date:  4-12-22 Date:   Date:     Activity/Exercise Parameters Parameters Parameters   Biofeedback With sEMG was utilized for coordination of PFM. Supine, sitting, standing                                           MANUAL THERAPY: () to improve soft tissue tone    Date Type Location Comments   4/28/2022 Internal assessment/treatment                                           (Used abbreviations: MET - muscle energy technique; SCS- Strain counter strain; CTM-Connective tissue mobilizations; CR- Contract/relax; SP- Sustained pressure, TrP-Trigger point release, IASTM- Instrument assisted soft tissue mobilizations, TDN-Trigger point dry needling)      [facility] Portal     Treatment/Session Summary:  Pt reports good understanding of plan of care, as well as prescribed home exercise program.  All questions were answered to pt's satisfaction. Pt was invited to call with any further questions or concerns. · Response to Treatment:  Patient able to progress there ex today  · Communication/Consultation:  None today  · Equipment provided today:  None today  · Recommendations/Intent for next treatment session: Next visit will focus on stretches and coordination of PFM. · Variation from POC: N/A  Total Treatment Billable Duration:  40 minutes there ex.    PT Patient Time In/Time Out  Time In: 1300  Time Out: 2 [address] [place] [address]    Future Appointments   Date Time Provider Tiana Alarcon   5/5/2022 10:00 AM Kimberlyn Morillo DPT [facility] [place]   5/10/2022 11:00 AM [facility]   5/19/2022 11:00 AM [facility]

## 2022-04-28 ENCOUNTER — HOSPITAL ENCOUNTER (OUTPATIENT)
Dept: PHYSICAL THERAPY | Age: 42
Discharge: HOME | End: 2022-04-28
Payer: MEDICAID

## 2022-04-28 PROCEDURE — 97110 THERAPEUTIC EXERCISES: CPT

## 2022-05-05 ENCOUNTER — HOSPITAL ENCOUNTER (OUTPATIENT)
Dept: PHYSICAL THERAPY | Age: 42
Discharge: HOME | End: 2022-05-05
Payer: MEDICAID

## 2022-05-05 NOTE — PROGRESS NOTES
Aniket Erasmo  : 1980  Primary: [employer]  Secondary:  [address]  at [address]  [phone]   [phone]                  DATE: 2022    Patient canceled appointment today due to illness    Karina Richards, DPT

## 2022-05-09 NOTE — PROGRESS NOTES
[facility]  : 1980  Primary:   Secondary:  [address] at [address] [facility], [address] [place] [address]  [phone]   [phone]        OUTPATIENT PHYSICAL THERAPY: Daily Treatment Note 5/10/2022  ICD-10: Treatment Diagnosis: Lack of coordination (muscle incoordination) (R27.8), Pelvic floor dysfunction in female (M62.98), Generalized weakness (M62.81), Urge incontinence (N39.41) and Full incontinence of feces (Fecal incontinence NOS) (R15.9)  Precautions/Allergies:   Ceftriaxone and Sulfamethoxazole-trimethoprim   TREATMENT PLAN:  Effective Dates: 2022 TO 2022 (90 days). Frequency/Duration: 1 time a week for 90 Day(s) MEDICAL/REFERRING DIAGNOSIS:  PFD (pelvic floor dysfunction) [M62.89]  Full incontinence of feces [R15.9]   DATE OF ONSET: chronic  REFERRING PHYSICIAN: Svetlana Giron MD Orders: evaluate and treat  Return MD Appointment: 2022     Pre-treatment Symptoms/Complaints:  Patient reports she is about the same. Her stomach has been acting up the past couple of days. She has had a little bit of leakage. She didn't make it to the bathroom in time yesterday. She goes to the doctor today. Pain: Initial:    Post Session:  0/10   Medications Last Reviewed:  5/10/2022   Updated Objective Findings:    Ms. Dwight Marie is a 35 yo female referred to pelvic floor physical therapy (PFPT) by Svetlana Giron,* 2/2 PFD and full incontinences of feces. Pt reports that symptoms began years ago. She had a bladder sling put in after her hysterectomy in 2017. She doesn't have much bladder incontinence right now unless she is super full of urine or is trying to go to the bathroom. It will be just a little bit. The bowel leakage has been going on for years. She has been seeing colorectal. Had a rectocele repair with the hysterectomy. She has always had IBS. She is dicyclomine and fiber.  Mostly has diarreaha but has been put on steroids and antibiotic which has helped. Some days she can have loose bowels or normal bowels. Its here and there. The incontinence of bowels can be a smear to a total loss. Contributing factors to IBS: stress, anxiety, depression and chronic pain. She has fibromyalgia, chronic neck/back pain, and CRPS. She goes to pain management. She has a referral for a psychiatrist. Dr. Beatriz South feels a high rectocele as per patient. She sometimes has to splint. She also noticed her sphincter didn't close all.       Urinary: Frequency 8-10 x/day, 1-2 x/night. Positive for urge urinary incontinence, urinary urgency, urinary hesitancy/intermittency and nocturia. Negative for stress urinary incontinence, urinary frequency, incomplete emptying, dysuria, hematuria and nocturnal enuresis. Pt does use pads for urinary protection; 1-3 pads per day (PPD). Fluid intake: 90 oz water/day; bladder irritants include: diet soda. Pt does not limit fluid intake due to bladder control. Bowel: Frequency once a day to a couple times a day. Positive for pushing/straining with bowel movement, fecal incontinence, constipation and incomplete emptying. Negative for pain with bowel movement. Pt does use pads for bowel protection; 1-3 pads per day (PPD). Use of stool softeners or laxatives? YES, fiber 1 in morning and 1 at night, fibercon   Sexual: Pt is not sexually active   Pelvic Organ Prolapse/Pelvic Pain: Location: pelvis internally. OB History: Number of pregnancies: 2 Number of vaginal deliveries: 2 Number of c-sections: 0. She tore from front to back with her son (4th degree)  External Observations:  · Voluntary contraction: []? absent     [x]? present  · Involuntary contraction: []? absent     [x]? present  · Involuntary relaxation: []? absent     [x]? present  · Perineal descent at rest: []? absent     [x]? present  · Perineal descent with bearing: []? absent     [x]?  present     Pelvic Floor Muscle (PFM) Assessment:  · Vaginal vault size: []? decreased []? increased     []? WNL  · Muscle volume: []? decreased     [x]? WNL     []? Defect  · PFM tension: []? decreased     [x]? increased     []? WNL     Contraction ability:  Voluntary contraction: []? absent     [x]? weak     []? moderate      []? strong  Voluntary relaxation: []? absent     [x]? partial     []? complete   MMT: 2/5   PFM endurance: 1 seconds   Overflow: []? absent     [x]? min     []? mod     []? severe / Compensatory mm groups include abdominals              Palpation: via rectal canal   Superficial Pelvic Floor Musculature (PFM): Tender? Intermediate PFM Tender? Deep PFM Tender? Superficial Transverse Perineal []? Right  []? Left  []? DNT Deep Transverse Perineal []? Right  []? Left  []? DNT Puborectalis [x]? Right  [x]? Left  []? DNT   Ischiocavernosus []? Right  []? Left  []? DNT Compressor Urethra []? Right  []? Left  []? DNT Pubococcygeus [x]? Right  [x]? Left  []? DNT   Bulbocavernosus []? Right  []? Left  []? DNT     Ileococcygeus [x]? Right  [x]? Left  []? DNT           Obturator Internus []? Right  []? Left  []? DNT           Coccygeus []? Right  []? Left  []? DNT           TREATMENT:   THERAPEUTIC EXERCISE: (40 minutes):  Exercises per grid below to improve mobility, strength, and coordination. Required minimal visual, verbal, and manual cues to promote proper body alignment, promote proper body posture, and promote proper body mechanics. Progressed resistance, range, and repetitions as indicated. All exercises performed with emphasis on kegel and breathing in order improve coordination, awareness and to minimize symptoms.     Date:  4-7-22 Date:  4-12-22 Date:  4-21-22 Date:  4-28-22 Date:  5-10-22   Activity/Exercise Parameters Parameters Parameters     Patient Education Discussed HEP and POC       Anton feliciano reviewed         drops Reviewed and given handout       Seated hip abduction  x20 purple band with PF x20 purple band with PF  x20 purple band with PF   Seated hip adduction  x20 yellow ball with PF x20 yellow ball with PF     Seated march  x20 B purple band with PF x20 B purple band with PF x20 B purple band with PF x20 B purple band with PF   Sit to stand  x8 x20   x20    sidestepping   x20 in place   x20 in place      Standing hip extension   x20 B   x20 B x20 B   2nd position bridge    x20 yellow ball   x20 yellow ball   clamshells    x20 L purple band x20 L purple band   SLR     To fatigue B     Standing Hip abduction     x20 B        Pt gives verbal consent to internal  assessment/treatment no chaperon present. NEURO REEDUCATION: (0 minutes) to improve control and coordination of pelvic floor     Date:  4-12-22 Date:   Date:     Activity/Exercise Parameters Parameters Parameters   Biofeedback With sEMG was utilized for coordination of PFM. Supine, sitting, standing                                           MANUAL THERAPY: () to improve soft tissue tone    Date Type Location Comments   5/10/2022 Internal assessment/treatment                                           (Used abbreviations: MET - muscle energy technique; SCS- Strain counter strain; CTM-Connective tissue mobilizations; CR- Contract/relax; SP- Sustained pressure, TrP-Trigger point release, IASTM- Instrument assisted soft tissue mobilizations, TDN-Trigger point dry needling)      [facility] Portal     Treatment/Session Summary:  Pt reports good understanding of plan of care, as well as prescribed home exercise program.  All questions were answered to pt's satisfaction. Pt was invited to call with any further questions or concerns. · Response to Treatment:  Patient was not feeling good today but able to complete there ex. · Communication/Consultation:  None today  · Equipment provided today:  None today  · Recommendations/Intent for next treatment session: Next visit will focus on stretches and coordination of PFM. · Variation from POC: N/A  Total Treatment Billable Duration:  40 minutes there ex.    PT Patient Time In/Time Out  Time In: 1100  Time Out: [address], DPT    Future Appointments   Date Time Provider Tiana Alarcon   5/19/2022 11:00 AM Bette Cerrato DPT [facility]

## 2022-05-10 ENCOUNTER — HOSPITAL ENCOUNTER (OUTPATIENT)
Dept: PHYSICAL THERAPY | Age: 42
Discharge: HOME | End: 2022-05-10
Payer: MEDICAID

## 2022-05-10 PROCEDURE — 97110 THERAPEUTIC EXERCISES: CPT

## 2022-05-18 NOTE — PROGRESS NOTES
Donnie Plaster  : 1980  Primary:   Secondary:  [address] [facility], [address] [place] [address]  [phone]   [phone]        OUTPATIENT PHYSICAL THERAPY: Daily Treatment Note 2022  ICD-10: Treatment Diagnosis: Lack of coordination (muscle incoordination) (R27.8), Pelvic floor dysfunction in female (M62.98), Generalized weakness (M62.81), Urge incontinence (N39.41) and Full incontinence of feces (Fecal incontinence NOS) (R15.9)  Precautions/Allergies:   Ceftriaxone and Sulfamethoxazole-trimethoprim   TREATMENT PLAN:  Effective Dates: 2022 TO 2022 (90 days). Frequency/Duration: 1 time a week for 90 Day(s) MEDICAL/REFERRING DIAGNOSIS:  PFD (pelvic floor dysfunction) [M62.89]  Full incontinence of feces [R15.9]   DATE OF ONSET: chronic  REFERRING PHYSICIAN: Sultana Finch MD Orders: evaluate and treat  Return MD Appointment: 2022     Pre-treatment Symptoms/Complaints:  Patient reports she called the doctor about her chills and bad kidney pain. Was told to stop the new medicine for now. Still had chills the next day. Went to do a urine analysis and it was cloudy. Went to urgent care and they did a urinalysis and was positive for a UTI. She is now on antibiotics. She is still having on and off chills with kidney pain. Bladder and bowel have been about the same. She does feel like she is able to hold a little bit longer. Pain: Initial:    Post Session:  0/10   Medications Last Reviewed:  2022   Updated Objective Findings:    Ms. Terrell Ivy is a 35 yo female referred to pelvic floor physical therapy (PFPT) by Sultana Finch,* 2/2 PFD and full incontinences of feces. Pt reports that symptoms began years ago. She had a bladder sling put in after her hysterectomy in 2017. She doesn't have much bladder incontinence right now unless she is super full of urine or is trying to go to the bathroom. It will be just a little bit. The bowel leakage has been going on for years. She has been seeing colorectal. Had a rectocele repair with the hysterectomy. She has always had IBS. She is dicyclomine and fiber. Mostly has diarreaha but has been put on steroids and antibiotic which has helped. Some days she can have loose bowels or normal bowels. Its here and there. The incontinence of bowels can be a smear to a total loss. Contributing factors to IBS: stress, anxiety, depression and chronic pain. She has fibromyalgia, chronic neck/back pain, and CRPS. She goes to pain management. She has a referral for a psychiatrist. Dr. Beatriz South feels a high rectocele as per patient. She sometimes has to splint. She also noticed her sphincter didn't close all.       Urinary: Frequency 8-10 x/day, 1-2 x/night. Positive for urinary hesitancy/intermittency and nocturia. Negative for urinary urgency, urge urinary incontinence, stress urinary incontinence, urinary frequency, incomplete emptying, dysuria, hematuria and nocturnal enuresis. Pt does use pads for urinary protection; 1-3 pads per day (PPD). Fluid intake: 90 oz water/day; bladder irritants include: diet soda. Pt does not limit fluid intake due to bladder control. Bowel: Frequency once a day to a couple times a day. Positive for pushing/straining with bowel movement, fecal incontinence, constipation and incomplete emptying. Negative for pain with bowel movement. Pt does use pads for bowel protection; 1-3 pads per day (PPD). Use of stool softeners or laxatives? YES, fiber 1 in morning and 1 at night, fibercon   Sexual: Pt is not sexually active   Pelvic Organ Prolapse/Pelvic Pain: Location: pelvis internally. OB History: Number of pregnancies: 2 Number of vaginal deliveries: 2 Number of c-sections: 0. She tore from front to back with her son (4th degree)  External Observations:  · Voluntary contraction: []? absent     [x]? present  · Involuntary contraction: []? absent     [x]? present  · Involuntary relaxation: []? absent     [x]? present  · Perineal descent at rest: []? absent     [x]? present  · Perineal descent with bearing: []? absent     [x]? present     Pelvic Floor Muscle (PFM) Assessment:  · Vaginal vault size: []? decreased     []? increased     []? WNL  · Muscle volume: []? decreased     [x]? WNL     []? Defect  · PFM tension: []? decreased     [x]? increased     []? WNL     Contraction ability:  Voluntary contraction: []? absent     [x]? weak     []? moderate      []? strong  Voluntary relaxation: []? absent     [x]? partial     []? complete   MMT: 2/5   PFM endurance: 1 seconds   Overflow: []? absent     [x]? min     []? mod     []? severe / Compensatory mm groups include abdominals              Palpation: via rectal canal   Superficial Pelvic Floor Musculature (PFM): Tender? Intermediate PFM Tender? Deep PFM Tender? Superficial Transverse Perineal []? Right  []? Left  []? DNT Deep Transverse Perineal []? Right  []? Left  []? DNT Puborectalis [x]? Right  [x]? Left  []? DNT   Ischiocavernosus []? Right  []? Left  []? DNT Compressor Urethra []? Right  []? Left  []? DNT Pubococcygeus [x]? Right  [x]? Left  []? DNT   Bulbocavernosus []? Right  []? Left  []? DNT     Ileococcygeus [x]? Right  [x]? Left  []? DNT           Obturator Internus []? Right  []? Left  []? DNT           Coccygeus []? Right  []? Left  []? DNT           TREATMENT:   THERAPEUTIC EXERCISE: (40 minutes):  Exercises per grid below to improve mobility, strength, and coordination. Required minimal visual, verbal, and manual cues to promote proper body alignment, promote proper body posture, and promote proper body mechanics. Progressed resistance, range, and repetitions as indicated. All exercises performed with emphasis on kegel and breathing in order improve coordination, awareness and to minimize symptoms.     Date:  4-7-22 Date:  4-12-22 Date:  4-21-22 Date:  4-28-22 Date:  5-10-22 Date:  5-19-22   Activity/Exercise Parameters Parameters Parameters      Patient Education Discussed HEP and POC        Squatty potty reviewed          drops Reviewed and given handout        Seated hip abduction  x20 purple band with PF x20 purple band with PF  x20 purple band with PF x20 purple band with PF   Seated hip adduction  x20 yellow ball with PF x20 yellow ball with PF      Seated march  x20 B purple band with PF x20 B purple band with PF x20 B purple band with PF x20 B purple band with PF x20 B purple band with PF   Sit to stand  x8 x20   x20     sidestepping   x20 in place   x20 in place    x20 in place   Standing hip extension   x20 B   x20 B x20 B    2nd position bridge    x20 yellow ball   x20 yellow ball x20 yellow ball   clamshells    x20 L purple band x20 L purple band x20 L purple band   SLR     To fatigue B   To fatigue B   Standing Hip abduction     x20 B   x20 B      Pt gives verbal consent to internal  assessment/treatment no chaperon present. NEURO REEDUCATION: (0 minutes) to improve control and coordination of pelvic floor     Date:  4-12-22 Date:   Date:     Activity/Exercise Parameters Parameters Parameters   Biofeedback With sEMG was utilized for coordination of PFM. Supine, sitting, standing                                           MANUAL THERAPY: () to improve soft tissue tone    Date Type Location Comments   5/19/2022 Internal assessment/treatment                                           (Used abbreviations: MET - muscle energy technique; SCS- Strain counter strain; CTM-Connective tissue mobilizations; CR- Contract/relax; SP- Sustained pressure, TrP-Trigger point release, IASTM- Instrument assisted soft tissue mobilizations, TDN-Trigger point dry needling)      [facility] Portal     Treatment/Session Summary:  Pt reports good understanding of plan of care, as well as prescribed home exercise program.  All questions were answered to pt's satisfaction.  Pt was invited to call with any further questions or concerns. · Response to Treatment:  Patient continues to not be feeling well and has a lot going on. Was able to complete there ex. · Communication/Consultation:  None today  · Equipment provided today:  None today  · Recommendations/Intent for next treatment session: Next visit will focus on stretches and coordination of PFM. · Variation from POC: N/A  Total Treatment Billable Duration:  40 minutes there ex. PT Patient Time In/Time Out  Time In: 1100  Time Out: [address], DPT    No future appointments.

## 2022-05-19 ENCOUNTER — HOSPITAL ENCOUNTER (OUTPATIENT)
Dept: PHYSICAL THERAPY | Age: 42
Discharge: HOME | End: 2022-05-19
Payer: MEDICAID

## 2022-05-19 PROCEDURE — 97110 THERAPEUTIC EXERCISES: CPT

## 2022-08-17 NOTE — THERAPY EVALUATION
Cesar Aguila  : 1980  Primary: [address] Medicaid  Secondary:  [address] @ [address]  Kamini Gilbert [address] [address]  Phone: [phone]  Fax: [phone] Plan Frequency: one time a week for 90 days  Plan of Care/Certification Expiration Date: 22    PT Visit Info:    No data recorded    Visit Count:  1    OUTPATIENT PHYSICAL THERAPY:OP NOTE TYPE: Initial Assessment 2022               Episode  Appt Desk         Treatment Diagnosis:  Lack of coordination (muscle incoordination) (R27.8)  Pelvic floor dysfunction in female (M62.98)  Generalized weakness (M62.81)  Urge incontinence (N39.41)  Full incontinence of feces (Fecal incontinence NOS) (R15.9)  Medical/Referring Diagnosis:  Full incontinence of feces [R15.9]  Other specified disorders of muscle [M62.89]  Referring Physician:  KIM Moran MD Orders:  PT Eval and Treat   Return MD Appt:  -  Date of Onset:  No data recorded   Allergies:  Ceftriaxone and Sulfamethoxazole-trimethoprim  Restrictions/Precautions:    No data recordedNo data recorded   Medications Last Reviewed:  2022     SUBJECTIVE   History of Injury/Illness (Reason for Referral):  Ms. Nohemy Martin is a 35 yo female referred to pelvic floor physical therapy (PFPT) by KIM Moran 2/2 Full incontinence of feces [R15.9]  Other specified disorders of muscle [M62.89] Pt reports that symptoms began years ago. Patient was seen in pelvic  back in the Spring of 2022. She has a history of bladder sling, hysterectomy, IBS, fibromyalgia, chronic neck/back pain, and CRPS. Patient had gone back to the doctor. Everything has been pretty much the same. She started new medications (levsin) she now fluctuates between constipation and diarrhea and not just diarrhea. This past week she is now going days without going. Nothing will come out. This morning a small amount came out. She does feel like something is there.  She still has trouble pushing it out when its well formed. With the diarrhea she can't hold it. She feels oozy. If she is full in the rectum it puts pressure on her bladder and has more leakage. This past few weeks she is been having to splint more. She is trying to follow the Low [place]. She has lost some weight. She notices a difference if she doesn't eat as much greasy foods. She just went to pain management this past week and is now back on the patch. Urinary: Frequency 5-8 x/day, 1 x/night. Positive for urge urinary incontinence. Negative for stress urinary incontinence, urinary urgency, urinary frequency, incomplete emptying, urinary hesitancy/intermittency, dysuria, hematuria, nocturia, and nocturnal enuresis. Pt does use pads for urinary protection; 1 pads per day (PPD). Fluid intake: 8 glasses water/day; bladder irritants include: diet soda. Pt does not limit fluid intake due to bladder control. Bowel: Frequency every couple days to diarrhea. Positive for pushing/straining with bowel movement, fecal incontinence, constipation, and incomplete emptying. Negative for pain with bowel movement. Pt does use pads for bowel protection; 4 pads per day (PPD). Use of stool softeners or laxatives? Yes, fiber daily    Sexual: Pt is not sexually   Pelvic Organ Prolapse/Pelvic Pain: Location: some discomfort with bulging of rectocele    OB History: Number of pregnancies: 2 Number of vaginal deliveries: 2 Number of c-sections: 0. Initial:      /10 Post Session:      /10  Past Medical History/Comorbidities:   Ms. Juana Larson  has a past medical history of Anxiety, Costochondritis, GERD (gastroesophageal reflux disease), Obesity (BMI 30.0-34.9), and PUD (peptic ulcer disease). Ms. Juana Larson  has a past surgical history that includes Upper gastrointestinal endoscopy (2016); Hysterectomy (2017); orthopedic surgery (Right, 03/2019); and Tubal ligation.   Social History/Living Environment:   Lives With: Family   Prior Level of Function/Work/Activity:   Prior level of function: Independent  Type of Occupation: not working  [address]: [address] [place] [address] recorded   Learning:   Does the patient/guardian have any barriers to learning?: No barriers  Will there be a co-learner?: No  What is the preferred language of the patient/guardian?: English  Is an  required?: No  How does the patient/guardian prefer to learn new concepts?: Listening; Reading; Demonstration; Pictures/Videos   Fall Risk Scale: Total Score: 0  Olsen Fall Risk: Low (0-24)         OBJECTIVE   OBSERVATION:   External Observations:  Voluntary contraction: [] absent     [x] present  Involuntary contraction: [] absent     [x] present  Involuntary relaxation: [] absent     [x] present      Pelvic Floor Muscle (PFM) Assessment:  Vaginal vault size: [] decreased     [] increased     [x] WNL  Muscle volume: [] decreased     [x] WNL     [] Defect  PFM tension: [] decreased     [x] increased     [] WNL    Contraction ability:  Voluntary contraction: [] absent     [x] weak     [] moderate      [] strong  Voluntary relaxation: [] absent     [x] partial     [] complete   MMT: 2/5   PFM endurance: 1 seconds   Overflow: [x] absent     [] min     [] mod     [] severe / Compensatory mm groups include   Levator Avulsion: [x] Negative  [] Positive          Palpation: via rectal canal   Superficial Pelvic Floor Musculature (PFM): Tender? Intermediate PFM Tender? Deep PFM Tender?    Superficial Transverse Perineal [] Right  [] Left  []DNT Deep Transverse Perineal [] Right  [] Left  []DNT Puborectalis [x] Right  [x] Left  []DNT   Ischiocavernosus [] Right  [] Left  []DNT Compressor Urethra [] Right  [] Left  []DNT Pubococcygeus [x] Right  [x] Left  []DNT   Bulbocavernosus [] Right  [] Left  []DNT   Ileococcygeus [x] Right  [x] Left  []DNT       Obturator Internus [x] Right  [x] Left  []DNT       Coccygeus [] Right  [] Left  []DNT ASSESSMENT   Initial Assessment:  Lauren Conrad presents with musculoskeletal limitations including pelvic floor muscle (PFM) tension, reduced PFM Range of Motion (ROM), increased tenderness to palpation of the PFM, reduced coordination and awareness of PFM, and decreased pelvic floor muscle (PFM) strength. These limitations are impairing the patient's ability to properly coordinate perineal elevation and descent for normalized PFM function, contributing to voiding dysfunction. As a result, she is limited in her/his ability to participate in household chores, physical activities such as walking, swimming, or other exercise, entertainment activities such as going to a movie or concert, traveling by car or bus for a distance greater then 30 minutes away from home, and social activities outside of the home. Problem List: (Impacting functional limitations): Body Structures, Functions, Activity Limitations Requiring Skilled Therapeutic Intervention: Decreased ADL status; Decreased ROM; Decreased strength; Decreased coordination; Increased pain   Therapy Prognosis:   Therapy Prognosis: Fair   Assessment Complexity:   Medium Complexity  PLAN   Effective Dates: 7-06-21XK Plan of Care/Certification Expiration Date: 11/16/22   Frequency/Duration: Plan Frequency: one time a week for 90 days   Interventions Planned (Treatment may consist of any combination of the following):    Current Treatment Recommendations: Strengthening; ROM; ADL/Self-care training; Neuromuscular re-education; Manual Therapy - Soft Tissue Mobilization; Pain management; Home exercise program; Safety education & training; Patient/Caregiver education & training; Equipment evaluation, education, & procurement;  Therapeutic activities   Goals: (Goals have been discussed and agreed upon with patient.)  Short-Term Functional Goals: Time Frame: 6 weeks  Pt will demonstrate I with basic PFM HEP to improve awareness, coordination, and timing of PFM. Patient will demonstrate understanding of and ability to teach back appropriate water and fiber intake, toileting frequency, and positioning for improved self-management of symptoms. Patient will demonstrate understanding of and ability to teach back two strategies to reduce constipation and improve toileting to minimize strain on and/or paradoxical movement of the pelvic floor muscles. Patient will demonstrate ability to perform diaphragmatic breathing in multiple positions in order to improve pelvic floor muscle (PFM) lengthening and reduced discomfort and/or straining to aid in bowel evacuation. Discharge Goals: Time Frame: 12 weeks  Patient will demonstrate ability to increase intra-abdominal pressure and eccentrically contract the pelvic floor muscles without breath holding, as assessed by digitally or with use of sEMG biofeedback, in order to improve bowel evacuation. Patient will report minimal to no pain upon examination of the levator ani muscles and report minimal or no discomfort with bowel evacuation. Patient will increase external anal sphincter squeeze pressure from 1 to 2 in order to reduce pad usage by 1. Pelvic Floor Impact Questionnaire--short form 7 (PFIQ-7):  Score:  Initial:   Bladder or Urine: /100  Bowel or Rectum: /100  Vagina or Pelvis: /100 Most Recent: X (Date: -- )  Bladder or Urine: X/100  Bowel or Rectum: X/100  Vagina or Pelvis: X/100   Interpretation of Score: Each of the 7 sections is scored on a scale from 0-3; 0 representing \"Not at all\", 3 representing \"Quite a bit\". The mean value is taken from all the answered items, then multiplied by 100 to obtain the scale score, ranging from 0-100. This process is repeated for each column representing bowel, bladder, and pelvic pain. Medical Necessity:   > Patient demonstrates fair rehab potential due to higher previous functional level.   Reason For Services/Other Comments:  > Patient continues to require skilled intervention due to above mentioned deficits. Total Duration:  Time In: 1100  Time Out: 1200    Regarding Catina Posey's therapy, I certify that the treatment plan above will be carried out by a therapist or under their direction. Thank you for this referral,  Chintan Lizarraga.  Carlton Abebe     Referring Physician Signature: KIM Yuen _______________________________ Date _____________        Post Session Pain  Charge Capture  PT Visit Info MD Guidelines  MyChart

## 2022-08-17 NOTE — PROGRESS NOTES
Carly Moscoso  : 1980  Primary: [address] [place] [address] Medicaid  Secondary:  [address] @ [address] [address]  Phone: [phone]  Fax: [phone] Plan Frequency: one time a week for 90 days    Plan of Care/Certification Expiration Date: 22    PT Visit Info:  No data recorded   Visit Count:  1   OUTPATIENT PHYSICAL THERAPY:OP NOTE TYPE: Treatment Note 2022       Episode  }Appt Desk             Treatment Diagnosis:  Lack of coordination (muscle incoordination) (R27.8)  Pelvic floor dysfunction in female (M62.98)  Generalized weakness (M62.81)  Urge incontinence (N39.41)  Full incontinence of feces (Fecal incontinence NOS) (R15.9)  Medical/Referring Diagnosis:  Full incontinence of feces [R15.9]  Other specified disorders of muscle [M62.89]  Referring Physician:  KIM Zhu MD Orders:  PT Eval and Treat   Date of Onset:  No data recorded   Allergies:   Ceftriaxone and Sulfamethoxazole-trimethoprim  Restrictions/Precautions:  No data recordedNo data recorded   Interventions Planned (Treatment may consist of any combination of the following):    Current Treatment Recommendations: Strengthening; ROM; ADL/Self-care training; Neuromuscular re-education; Manual Therapy - Soft Tissue Mobilization; Pain management; Home exercise program; Safety education & training; Patient/Caregiver education & training; Equipment evaluation, education, & procurement; Therapeutic activities     Subjective Comments:     Initial:}     /10Post Session:        /10  Medications Last Reviewed:  2022  Updated Objective Findings:  See evaluation note from today  Treatment   THERAPEUTIC EXERCISE: (10 minutes):    Exercises per grid below to improve mobility, strength, and coordination.   Required minimal visual, verbal, manual, and tactile cues to promote proper body alignment, promote proper body posture, promote proper body mechanics, and promote proper body breathing techniques. Progressed resistance, range, repetitions, and complexity of movement as indicated. Date:  8-18-22 Date:   Date:     Activity/Exercise Parameters Parameters Parameters   Patient Education Discussed HEP and POC     HEP Patient to continue HEP from therapy              All exercises performed with emphasis on kegel and breathing in order improve coordination, awareness and to minimize symptoms. MANUAL THERAPY: ( minutes): to improve soft tissue tone    Date Type Location Comments   8/18/2022 Internal assessment/treatment                                         (Used abbreviations: MET - muscle energy technique; SCS- Strain counter strain; CTM-Connective tissue mobilizations; CR- Contract/Relax; SP- Sustained pressure; PIT- Positional inhibition techniques; STM Soft -tissue mobilization; MM- Myofascial mobilization; TrP-Trigger point release; IASTM- Instrument assisted soft tissue mobilizations, TDN-Trigger point dry needling)    Pt gives verbal consent to internal rectal assessment/treatment without chaperon present. NEURO REEDUCATION: () to improve control and coordination of pelvic floor     Date:   Date:   Date:     Activity/Exercise Parameters Parameters Parameters   Biofeedback With sEMG was utilized for coordination of PFM.                                                 THERAPEUTIC ACTIVITY: ( minutes):     TA Educational Topic Notes Date Completed   Pathology/Anatomy/PFM Function     Bladder health education     Urinary urge suppression     The knack     Voiding strategies     Nocturia tips     Bowel/Bladder log     Bowel health education     Constipation care     Diarrhea/Fecal leakage     Colonic massage     Toilet positioning     Defecation dynamics     Sources of fiber     Return to intercourse/Dilator training     Sexual positioning     Lubricants/vaginal moisturizers     Vaginal irritants     Body mechanics     Posture/ergonomics     Diaphragmatic breathing Resources and technology          Treatment/Session Summary:    Treatment Assessment:     Communication/Consultation:  None today  Equipment provided today:  None  Recommendations/Intent for next treatment session: Next visit will focus on electrical stimulation. Total Treatment Billable Duration:  10 minutes  Time In: 1100  Time Out: [address].  Sherrell Charles, PT       Charge Capture  }Post Session Pain  PT Visit Info  [address] [facility] Portal  MD Guidelines  Scanned Media  Benefits  MyChart    Future Appointments   Date Time Provider Ruddy Ewing   8/25/2022 11:00 AM Ondina Corcoran, PT [facility]   8/30/2022 11:00 AM Ondina Corcoran, PT [facility]   9/8/2022 11:00 AM Ondina Corcoran, PT [facility]   9/20/2022 11:00 AM Ondina Corcoran, PT [facility]   9/27/2022 11:00 AM Ondina Corcoran, PT [facility]   10/4/2022 11:00 AM Ondina Corcoran, PT [facility]

## 2022-08-18 ENCOUNTER — HOSPITAL ENCOUNTER (OUTPATIENT)
Dept: PHYSICAL THERAPY | Age: 42
Setting detail: RECURRING SERIES
Discharge: HOME | End: 2022-08-21
Payer: MEDICAID

## 2022-08-18 PROCEDURE — 97162 PT EVAL MOD COMPLEX 30 MIN: CPT

## 2022-08-18 PROCEDURE — 97110 THERAPEUTIC EXERCISES: CPT

## 2022-08-24 NOTE — PROGRESS NOTES
[R15.9]  Other specified disorders of muscle [M62.89] Pt reports that symptoms began years ago. Patient was seen in pelvic PT back in the Spring of 2022. She has a history of bladder sling, hysterectomy, IBS, fibromyalgia, chronic neck/back pain, and CRPS. Patient had gone back to the doctor. Everything has been pretty much the same. She started new medications (levsin) she now fluctuates between constipation and diarrhea and not just diarrhea. This past week she is now going days without going. Nothing will come out. This morning a small amount came out. She does feel like something is there. She still has trouble pushing it out when its well formed. With the diarrhea she can't hold it. She feels oozy. If she is full in the rectum it puts pressure on her bladder and has more leakage. This past few weeks she is been having to splint more. She is trying to follow the [place]. She has lost some weight. She notices a difference if she doesn't eat as much greasy foods. She just went to pain management this past week and is now back on the patch. Urinary: Frequency 5-8 x/day, 1 x/night. Positive for urge urinary incontinence. Negative for stress urinary incontinence, urinary urgency, urinary frequency, incomplete emptying, urinary hesitancy/intermittency, dysuria, hematuria, nocturia, and nocturnal enuresis. Pt does use pads for urinary protection; 1 pads per day (PPD). Fluid intake: 8 glasses water/day; bladder irritants include: diet soda. Pt does not limit fluid intake due to bladder control. Bowel: Frequency every couple days to diarrhea. Positive for pushing/straining with bowel movement, fecal incontinence, constipation, and incomplete emptying. Negative for pain with bowel movement. Pt does use pads for bowel protection; 4 pads per day (PPD). Use of stool softeners or laxatives?  Yes, fiber daily     Sexual: Pt is not sexually   Pelvic Organ Prolapse/Pelvic Pain: Location: some discomfort with bulging of rectocele     OB History: Number of pregnancies: 2 Number of vaginal deliveries: 2 Number of c-sections: 0. External Observations:  Voluntary contraction: [] absent     [x] present  Involuntary contraction: [] absent     [x] present  Involuntary relaxation: [] absent     [x] present        Pelvic Floor Muscle (PFM) Assessment:  Vaginal vault size: [] decreased     [] increased     [x] WNL  Muscle volume: [] decreased     [x] WNL     [] Defect  PFM tension: [] decreased     [x] increased     [] WNL     Contraction ability:  Voluntary contraction: [] absent     [x] weak     [] moderate      [] strong  Voluntary relaxation: [] absent     [x] partial     [] complete   MMT: 2/5   PFM endurance: 1 seconds   Overflow: [x] absent     [] min     [] mod     [] severe / Compensatory mm groups include   Levator Avulsion: [x] Negative  [] Positive              Palpation: via rectal canal   Superficial Pelvic Floor Musculature (PFM): Tender? Intermediate PFM Tender? Deep PFM Tender? Superficial Transverse Perineal [] Right  [] Left  []DNT Deep Transverse Perineal [] Right  [] Left  []DNT Puborectalis [x] Right  [x] Left  []DNT   Ischiocavernosus [] Right  [] Left  []DNT Compressor Urethra [] Right  [] Left  []DNT Pubococcygeus [x] Right  [x] Left  []DNT   Bulbocavernosus [] Right  [] Left  []DNT     Ileococcygeus [x] Right  [x] Left  []DNT           Obturator Internus [x] Right  [x] Left  []DNT           Coccygeus [] Right  [] Left  []DNT           Treatment   THERAPEUTIC EXERCISE: (40 minutes):    Exercises per grid below to improve mobility, strength, and coordination. Required minimal visual, verbal, manual, and tactile cues to promote proper body alignment, promote proper body posture, promote proper body mechanics, and promote proper body breathing techniques. Progressed resistance, range, repetitions, and complexity of movement as indicated.    Date:  8-18-22 Date:  8-25-22 Date:     Activity/Exercise Parameters Parameters Parameters   Patient Education Discussed HEP and POC     HEP Patient to continue HEP from therapy     Supine kegel with ball  x20    2nd position bridge  X20 yellow ball    Hooklying abduction with band  Blue band x 20    Seated march  X20 B      SLR with kegel  X20 B      Sit to stand  With table elevated x 20    Standing hip abduction with kegel  X20 B    Standing hip extension with kegel  X20 B    Sidestep in place  X20         All exercises performed with emphasis on kegel and breathing in order improve coordination, awareness and to minimize symptoms. MANUAL THERAPY: ( minutes): to improve soft tissue tone    Date Type Location Comments   8/25/2022 Internal assessment/treatment                                         (Used abbreviations: MET - muscle energy technique; SCS- Strain counter strain; CTM-Connective tissue mobilizations; CR- Contract/Relax; SP- Sustained pressure; PIT- Positional inhibition techniques; STM Soft -tissue mobilization; MM- Myofascial mobilization; TrP-Trigger point release; IASTM- Instrument assisted soft tissue mobilizations, TDN-Trigger point dry needling)    Pt gives verbal consent to internal rectal assessment/treatment without chaperon present. NEURO REEDUCATION: () to improve control and coordination of pelvic floor     Date:   Date:   Date:     Activity/Exercise Parameters Parameters Parameters   Biofeedback With sEMG was utilized for coordination of PFM.                                                 THERAPEUTIC ACTIVITY: ( minutes):     TA Educational Topic Notes Date Completed   Pathology/Anatomy/PFM Function     Bladder health education     Urinary urge suppression     The knack     Voiding strategies     Nocturia tips     Bowel/Bladder log     Bowel health education     Constipation care     Diarrhea/Fecal leakage     Colonic massage     Toilet positioning     Defecation dynamics     Sources of fiber     Return to intercourse/Dilator training     Sexual positioning     Lubricants/vaginal moisturizers     Vaginal irritants     Body mechanics     Posture/ergonomics     Diaphragmatic breathing     Resources and technology          Treatment/Session Summary:    Treatment Assessment: Patients knees were bothering her today but was able to complete there ex with breaks. Next visit electrical simulation     Communication/Consultation:  None today  Equipment provided today:  None  Recommendations/Intent for next treatment session: Next visit will focus on electrical stimulation. Total Treatment Billable Duration:  40 minutes there ex  Time In: 1100  Time Out: [address]  Zara Guerra, PT       Charge Capture  }Post Session Pain  PT Visit Info  [address] [facility] Portal  MD Guidelines  Scanned Media  Benefits  MyChart    Future Appointments   Date Time Provider Ruddy Ewing   8/30/2022 11:00 AM [facility]   9/8/2022 11:00 AM [facility]   9/20/2022 11:00 AM Ne Robles, PT [facility]   9/27/2022 11:00 AM Ne Robles, PT [facility]   10/4/2022 11:00 AM [facility]

## 2022-08-25 ENCOUNTER — HOSPITAL ENCOUNTER (OUTPATIENT)
Dept: PHYSICAL THERAPY | Age: 42
Setting detail: RECURRING SERIES
Discharge: HOME | End: 2022-08-28
Payer: MEDICAID

## 2022-08-25 PROCEDURE — 97110 THERAPEUTIC EXERCISES: CPT

## 2022-08-30 ENCOUNTER — HOSPITAL ENCOUNTER (OUTPATIENT)
Dept: PHYSICAL THERAPY | Age: 42
Setting detail: RECURRING SERIES
End: 2022-08-30
Payer: MEDICAID

## 2022-08-30 NOTE — PROGRESS NOTES
DATE: 8/30/2022    Patient canceled appointment less than 24 hours notice due to sick child. Melissa Cool.  Isaura Basurto

## 2022-09-07 NOTE — PROGRESS NOTES
Michelle Song  : 1980  Primary: [address] [place] [address] Medicaid  Secondary:  [address] @ [address] [address]  Phone: [phone]  Fax: [phone] Plan Frequency: one time a week for 90 days    Plan of Care/Certification Expiration Date: 22      PT Visit Info:  No data recorded   Visit Count:  3   OUTPATIENT PHYSICAL THERAPY:OP NOTE TYPE: Treatment Note 2022       Episode  }Appt Desk             Treatment Diagnosis:  Lack of coordination (muscle incoordination) (R27.8)  Pelvic floor dysfunction in female (M62.98)  Generalized weakness (M62.81)  Urge incontinence (N39.41)  Full incontinence of feces (Fecal incontinence NOS) (R15.9)  Medical/Referring Diagnosis:  Full incontinence of feces [R15.9]  Other specified disorders of muscle [M62.89]  Referring Physician:  KIM Tapia MD Orders:  PT Eval and Treat   Date of Onset:  No data recorded   Allergies:   Ceftriaxone and Sulfamethoxazole-trimethoprim  Restrictions/Precautions:  No data recordedNo data recorded   Interventions Planned (Treatment may consist of any combination of the following):    Current Treatment Recommendations: Strengthening; ROM; ADL/Self-care training; Neuromuscular re-education; Manual Therapy - Soft Tissue Mobilization; Pain management; Home exercise program; Safety education & training; Patient/Caregiver education & training; Equipment evaluation, education, & procurement; Therapeutic activities     Subjective Comments: Patient reports after she left last time her knees were really achy. Initial:}     /10Post Session:        /10  Medications Last Reviewed:  2022  Updated Objective Findings:   Ms. Keily Gibbs is a 35 yo female referred to pelvic floor physical therapy (PFPT) by KIM Tapia 2/2 Full incontinence of feces [R15.9]  Other specified disorders of muscle [M62.89] Pt reports that symptoms began years ago.   Patient was seen in pelvic PT back in the Spring of 2022. She has a history of bladder sling, hysterectomy, IBS, fibromyalgia, chronic neck/back pain, and CRPS. Patient had gone back to the doctor. Everything has been pretty much the same. She started new medications (levsin) she now fluctuates between constipation and diarrhea and not just diarrhea. This past week she is now going days without going. Nothing will come out. This morning a small amount came out. She does feel like something is there. She still has trouble pushing it out when its well formed. With the diarrhea she can't hold it. She feels oozy. If she is full in the rectum it puts pressure on her bladder and has more leakage. This past few weeks she is been having to splint more. She is trying to follow the [place]. She has lost some weight. She notices a difference if she doesn't eat as much greasy foods. She just went to pain management this past week and is now back on the patch. Urinary: Frequency 5-8 x/day, 1 x/night. Positive for urge urinary incontinence. Negative for stress urinary incontinence, urinary urgency, urinary frequency, incomplete emptying, urinary hesitancy/intermittency, dysuria, hematuria, nocturia, and nocturnal enuresis. Pt does use pads for urinary protection; 1 pads per day (PPD). Fluid intake: 8 glasses water/day; bladder irritants include: diet soda. Pt does not limit fluid intake due to bladder control. Bowel: Frequency every couple days to diarrhea. Positive for pushing/straining with bowel movement, fecal incontinence, constipation, and incomplete emptying. Negative for pain with bowel movement. Pt does use pads for bowel protection; 4 pads per day (PPD). Use of stool softeners or laxatives?  Yes, fiber daily     Sexual: Pt is not sexually   Pelvic Organ Prolapse/Pelvic Pain: Location: some discomfort with bulging of rectocele     OB History: Number of pregnancies: 2 Number of vaginal deliveries: 2 Number of c-sections: 0. External Observations:  Voluntary contraction: [] absent     [x] present  Involuntary contraction: [] absent     [x] present  Involuntary relaxation: [] absent     [x] present        Pelvic Floor Muscle (PFM) Assessment:  Vaginal vault size: [] decreased     [] increased     [x] WNL  Muscle volume: [] decreased     [x] WNL     [] Defect  PFM tension: [] decreased     [x] increased     [] WNL     Contraction ability:  Voluntary contraction: [] absent     [x] weak     [] moderate      [] strong  Voluntary relaxation: [] absent     [x] partial     [] complete   MMT: 2/5   PFM endurance: 1 seconds   Overflow: [x] absent     [] min     [] mod     [] severe / Compensatory mm groups include   Levator Avulsion: [x] Negative  [] Positive              Palpation: via rectal canal   Superficial Pelvic Floor Musculature (PFM): Tender? Intermediate PFM Tender? Deep PFM Tender? Superficial Transverse Perineal [] Right  [] Left  []DNT Deep Transverse Perineal [] Right  [] Left  []DNT Puborectalis [x] Right  [x] Left  []DNT   Ischiocavernosus [] Right  [] Left  []DNT Compressor Urethra [] Right  [] Left  []DNT Pubococcygeus [x] Right  [x] Left  []DNT   Bulbocavernosus [] Right  [] Left  []DNT     Ileococcygeus [x] Right  [x] Left  []DNT           Obturator Internus [x] Right  [x] Left  []DNT           Coccygeus [] Right  [] Left  []DNT           Treatment   THERAPEUTIC EXERCISE: (40 minutes):    Exercises per grid below to improve mobility, strength, and coordination. Required minimal visual, verbal, manual, and tactile cues to promote proper body alignment, promote proper body posture, promote proper body mechanics, and promote proper body breathing techniques. Progressed resistance, range, repetitions, and complexity of movement as indicated.    Date:  8-18-22 Date:  8-25-22 Date:  9-8-22   Activity/Exercise Parameters Parameters Parameters   Patient Education Discussed HEP and POC Reviewed to continue there ex at home   HEP Patient to continue HEP from therapy     Supine kegel with ball  x20    2nd position bridge  X20 yellow ball    Hooklying abduction with band  Blue band x 20    Seated march  X20 B      SLR with kegel  X20 B      Sit to stand  With table elevated x 20    Standing hip abduction with kegel  X20 B    Standing hip extension with kegel  X20 B    Sidestep in place  X20      Electrical Stimulation for muscle activation (rectal)   10 sec on 20 sec off, patient actively chuckie muscles      All exercises performed with emphasis on kegel and breathing in order improve coordination, awareness and to minimize symptoms. MANUAL THERAPY: ( minutes): to improve soft tissue tone    Date Type Location Comments   9/8/2022 Internal assessment/treatment                                         (Used abbreviations: MET - muscle energy technique; SCS- Strain counter strain; CTM-Connective tissue mobilizations; CR- Contract/Relax; SP- Sustained pressure; PIT- Positional inhibition techniques; STM Soft -tissue mobilization; MM- Myofascial mobilization; TrP-Trigger point release; IASTM- Instrument assisted soft tissue mobilizations, TDN-Trigger point dry needling)    Pt gives verbal consent to internal rectal assessment/treatment without chaperon present. NEURO REEDUCATION: () to improve control and coordination of pelvic floor     Date:   Date:   Date:     Activity/Exercise Parameters Parameters Parameters   Biofeedback With sEMG was utilized for coordination of PFM.                                                 THERAPEUTIC ACTIVITY: ( minutes):     TA Educational Topic Notes Date Completed   Pathology/Anatomy/PFM Function     Bladder health education     Urinary urge suppression     The knack     Voiding strategies     Nocturia tips     Bowel/Bladder log     Bowel health education     Constipation care     Diarrhea/Fecal leakage     Colonic massage     Toilet positioning     Defecation dynamics     Sources of fiber     Return to intercourse/Dilator training     Sexual positioning     Lubricants/vaginal moisturizers     Vaginal irritants     Body mechanics     Posture/ergonomics     Diaphragmatic breathing     Resources and technology          Treatment/Session Summary:    Treatment Assessment: Patient able to tolerate stim today. Communication/Consultation:  None today  Equipment provided today:  None  Recommendations/Intent for next treatment session: Next visit will focus on electrical stimulation and strengthening of PFM. Total Treatment Billable Duration:  40 minutes there ex  Time In: 1100  Time Out: [address] [facility] [address], [address]  Oral Watkins, PT       Charge Capture  }Post Session Pain  PT Visit Info  [facility]  MD Guidelines  Scanned Media  Benefits  MyChart    Future Appointments   Date Time Provider Ruddy Ewing   9/20/2022 11:00 AM Grace Garza, PT [facility]   9/27/2022 11:00 AM Grace Garza, PT [facility]   10/4/2022 11:00 AM Grace Garza, PT [facility]

## 2022-09-08 ENCOUNTER — HOSPITAL ENCOUNTER (OUTPATIENT)
Dept: PHYSICAL THERAPY | Age: 42
Setting detail: RECURRING SERIES
Discharge: HOME | End: 2022-09-11
Payer: MEDICAID

## 2022-09-08 PROCEDURE — 97110 THERAPEUTIC EXERCISES: CPT

## 2022-09-19 NOTE — PROGRESS NOTES
Gayatri Gil  : 1980  Primary: [address] Medicaid  Secondary:  [address] @ [address] [address]  Phone: [phone]  Fax: [phone] Plan Frequency: one time a week for 90 days    Plan of Care/Certification Expiration Date: 22      PT Visit Info:  No data recorded   Visit Count:  4   OUTPATIENT PHYSICAL THERAPY:OP NOTE TYPE: Treatment Note 2022       Episode  }Appt Desk             Treatment Diagnosis:  Lack of coordination (muscle incoordination) (R27.8)  Pelvic floor dysfunction in female (M62.98)  Generalized weakness (M62.81)  Urge incontinence (N39.41)  Full incontinence of feces (Fecal incontinence NOS) (R15.9)  Medical/Referring Diagnosis:  Full incontinence of feces [R15.9]  Other specified disorders of muscle [M62.89]  Referring Physician:  KIM Hernandez MD Orders:  PT Eval and Treat   Date of Onset:  No data recorded   Allergies:   Ceftriaxone and Sulfamethoxazole-trimethoprim  Restrictions/Precautions:  No data recordedNo data recorded   Interventions Planned (Treatment may consist of any combination of the following):    Current Treatment Recommendations: Strengthening; ROM; ADL/Self-care training; Neuromuscular re-education; Manual Therapy - Soft Tissue Mobilization; Pain management; Home exercise program; Safety education & training; Patient/Caregiver education & training; Equipment evaluation, education, & procurement; Therapeutic activities     Subjective Comments: Patient reports she went to ortho and they gave her 3 options: prolo, burn nerve endings, meniscus. She did another prolo injection on thursday. She continues to have issues with pelvic floor. This week she had 4 times with leakage (2 times bladder).        Initial:}     /10Post Session:        /10  Medications Last Reviewed:  2022  Updated Objective Findings:   Ms. Ana Baez is a 35 yo female referred to pelvic floor physical therapy (PFPT) by KIM Gates 2/2 Full incontinence of feces [R15.9]  Other specified disorders of muscle [M62.89] Pt reports that symptoms began years ago. Patient was seen in pelvic PT back in the Spring of 2022. She has a history of bladder sling, hysterectomy, IBS, fibromyalgia, chronic neck/back pain, and CRPS. Patient had gone back to the doctor. Everything has been pretty much the same. She started new medications (levsin) she now fluctuates between constipation and diarrhea and not just diarrhea. This past week she is now going days without going. Nothing will come out. This morning a small amount came out. She does feel like something is there. She still has trouble pushing it out when its well formed. With the diarrhea she can't hold it. She feels oozy. If she is full in the rectum it puts pressure on her bladder and has more leakage. This past few weeks she is been having to splint more. She is trying to follow the [place]. She has lost some weight. She notices a difference if she doesn't eat as much greasy foods. She just went to pain management this past week and is now back on the patch. Urinary: Frequency 5-8 x/day, 1 x/night. Positive for urge urinary incontinence. Negative for stress urinary incontinence, urinary urgency, urinary frequency, incomplete emptying, urinary hesitancy/intermittency, dysuria, hematuria, nocturia, and nocturnal enuresis. Pt does use pads for urinary protection; 1 pads per day (PPD). Fluid intake: 8 glasses water/day; bladder irritants include: diet soda. Pt does not limit fluid intake due to bladder control. Bowel: Frequency every couple days to diarrhea. Positive for pushing/straining with bowel movement, fecal incontinence, constipation, and incomplete emptying. Negative for pain with bowel movement. Pt does use pads for bowel protection; 4 pads per day (PPD). Use of stool softeners or laxatives?  Yes, fiber daily Sexual: Pt is not sexually   Pelvic Organ Prolapse/Pelvic Pain: Location: some discomfort with bulging of rectocele     OB History: Number of pregnancies: 2 Number of vaginal deliveries: 2 Number of c-sections: 0. External Observations:  Voluntary contraction: [] absent     [x] present  Involuntary contraction: [] absent     [x] present  Involuntary relaxation: [] absent     [x] present        Pelvic Floor Muscle (PFM) Assessment:  Vaginal vault size: [] decreased     [] increased     [x] WNL  Muscle volume: [] decreased     [x] WNL     [] Defect  PFM tension: [] decreased     [x] increased     [] WNL     Contraction ability:  Voluntary contraction: [] absent     [x] weak     [] moderate      [] strong  Voluntary relaxation: [] absent     [x] partial     [] complete   MMT: 2/5   PFM endurance: 1 seconds   Overflow: [x] absent     [] min     [] mod     [] severe / Compensatory mm groups include   Levator Avulsion: [x] Negative  [] Positive              Palpation: via rectal canal   Superficial Pelvic Floor Musculature (PFM): Tender? Intermediate PFM Tender? Deep PFM Tender? Superficial Transverse Perineal [] Right  [] Left  []DNT Deep Transverse Perineal [] Right  [] Left  []DNT Puborectalis [x] Right  [x] Left  []DNT   Ischiocavernosus [] Right  [] Left  []DNT Compressor Urethra [] Right  [] Left  []DNT Pubococcygeus [x] Right  [x] Left  []DNT   Bulbocavernosus [] Right  [] Left  []DNT     Ileococcygeus [x] Right  [x] Left  []DNT           Obturator Internus [x] Right  [x] Left  []DNT           Coccygeus [] Right  [] Left  []DNT           Treatment   THERAPEUTIC EXERCISE: (25 minutes):    Exercises per grid below to improve mobility, strength, and coordination. Required minimal visual, verbal, manual, and tactile cues to promote proper body alignment, promote proper body posture, promote proper body mechanics, and promote proper body breathing techniques.   Progressed resistance, range, repetitions, and complexity of movement as indicated. Date:  8-18-22 Date:  8-25-22 Date:  9-8-22 Date:  9-20-22   Activity/Exercise Parameters Parameters Parameters    Patient Education Discussed HEP and POC  Reviewed to continue there ex at home    HEP Patient to continue HEP from therapy      Supine kegel with ball  x20     2nd position bridge  X20 yellow ball     Hooklying abduction with band  Blue band x 20     Seated march  X20 B       SLR with kegel  X20 B       Sit to stand  With table elevated x 20     Standing hip abduction with kegel  X20 B     Standing hip extension with kegel  X20 B     Sidestep in place  X20       Electrical Stimulation for muscle activation (rectal)   10 sec on 20 sec off, patient actively chuckie muscles 10 sec on 20 sec off, patient actively chuckie muscles      All exercises performed with emphasis on kegel and breathing in order improve coordination, awareness and to minimize symptoms. MANUAL THERAPY: ( minutes): to improve soft tissue tone    Date Type Location Comments   9/20/2022 Internal assessment/treatment                                         (Used abbreviations: MET - muscle energy technique; SCS- Strain counter strain; CTM-Connective tissue mobilizations; CR- Contract/Relax; SP- Sustained pressure; PIT- Positional inhibition techniques; STM Soft -tissue mobilization; MM- Myofascial mobilization; TrP-Trigger point release; IASTM- Instrument assisted soft tissue mobilizations, TDN-Trigger point dry needling)    Pt gives verbal consent to internal rectal assessment/treatment without chaperon present. NEURO REEDUCATION: () to improve control and coordination of pelvic floor     Date:   Date:   Date:     Activity/Exercise Parameters Parameters Parameters   Biofeedback With sEMG was utilized for coordination of PFM.                                                 THERAPEUTIC ACTIVITY: ( minutes):     TA Educational Topic Notes Date Completed   Pathology/Anatomy/PFM Function Bladder health education     Urinary urge suppression     The knack     Voiding strategies     Nocturia tips     Bowel/Bladder log     Bowel health education     Constipation care     Diarrhea/Fecal leakage     Colonic massage     Toilet positioning     Defecation dynamics     Sources of fiber     Return to intercourse/Dilator training     Sexual positioning     Lubricants/vaginal moisturizers     Vaginal irritants     Body mechanics     Posture/ergonomics     Diaphragmatic breathing     Resources and technology          Treatment/Session Summary:    Treatment Assessment: Patient able to tolerate stim today. Will try there ex when knee pain decreased. Communication/Consultation:  None today  Equipment provided today:  None  Recommendations/Intent for next treatment session: Next visit will focus on electrical stimulation and strengthening of PFM. Total Treatment Billable Duration:  25 minutes there ex  Time In: 1100  Time Out: Ruddy Keen.  Kateryna Draper, PT       Charge Capture  }Post Session Pain  PT Visit Info  [facility]  MD Guidelines  Scanned Media  Benefits  MyChart    Future Appointments   Date Time Provider Ruddy Ewing   9/27/2022 11:00 AM Tevin Grimm, PT [facility]   10/4/2022 11:00 AM Tevin Grimm, PT [facility]

## 2022-09-20 ENCOUNTER — HOSPITAL ENCOUNTER (OUTPATIENT)
Dept: PHYSICAL THERAPY | Age: 42
Setting detail: RECURRING SERIES
Discharge: HOME | End: 2022-09-23
Payer: MEDICAID

## 2022-09-20 PROCEDURE — 97110 THERAPEUTIC EXERCISES: CPT

## 2022-09-26 NOTE — PROGRESS NOTES
Linda Oseguera  : 1980  Primary: [address] [employer]  Secondary:  Keil Do @ [address] [address]  Phone: [phone]  Fax: [phone] Plan Frequency: one time a week for 90 days    Plan of Care/Certification Expiration Date: 22      PT Visit Info:  No data recorded   Visit Count:  5   OUTPATIENT PHYSICAL THERAPY:OP NOTE TYPE: Treatment Note 2022       Episode  }Appt Desk             Treatment Diagnosis:  Lack of coordination (muscle incoordination) (R27.8)  Pelvic floor dysfunction in female (M62.98)  Generalized weakness (M62.81)  Urge incontinence (N39.41)  Full incontinence of feces (Fecal incontinence NOS) (R15.9)  Medical/Referring Diagnosis:  Full incontinence of feces [R15.9]  Other specified disorders of muscle [M62.89]  Referring Physician:  KIM Bethea MD Orders:  PT Eval and Treat   Date of Onset:  No data recorded   Allergies:   Ceftriaxone and Sulfamethoxazole-trimethoprim  Restrictions/Precautions:  No data recordedNo data recorded   Interventions Planned (Treatment may consist of any combination of the following):    Current Treatment Recommendations: Strengthening; ROM; ADL/Self-care training; Neuromuscular re-education; Manual Therapy - Soft Tissue Mobilization; Pain management; Home exercise program; Safety education & training; Patient/Caregiver education & training; Equipment evaluation, education, & procurement; Therapeutic activities     Subjective Comments: Patient reports she tried to do the exercises two times last week. She has been trying to walk some as well. She has been on the constipation side with her bowels. Having to strain a little bit. Bowel leakage hasn't been too bad.       Initial:}     /10Post Session:        /10  Medications Last Reviewed:  2022  Updated Objective Findings:   Ms. Adrian Yu is a 35 yo female referred to pelvic floor physical therapy (PFPT) by Joe Will Pendleton Blitz, PA 2/2 Full incontinence of feces [R15.9]  Other specified disorders of muscle [M62.89] Pt reports that symptoms began years ago. Patient was seen in pelvic PT back in the Spring of 2022. She has a history of bladder sling, hysterectomy, IBS, fibromyalgia, chronic neck/back pain, and CRPS. Patient had gone back to the doctor. Everything has been pretty much the same. She started new medications (levsin) she now fluctuates between constipation and diarrhea and not just diarrhea. This past week she is now going days without going. Nothing will come out. This morning a small amount came out. She does feel like something is there. She still has trouble pushing it out when its well formed. With the diarrhea she can't hold it. She feels oozy. If she is full in the rectum it puts pressure on her bladder and has more leakage. This past few weeks she is been having to splint more. She is trying to follow the [place]. She has lost some weight. She notices a difference if she doesn't eat as much greasy foods. She just went to pain management this past week and is now back on the patch. Urinary: Frequency 5-8 x/day, 1 x/night. Positive for urge urinary incontinence. Negative for stress urinary incontinence, urinary urgency, urinary frequency, incomplete emptying, urinary hesitancy/intermittency, dysuria, hematuria, nocturia, and nocturnal enuresis. Pt does use pads for urinary protection; 1 pads per day (PPD). Fluid intake: 8 glasses water/day; bladder irritants include: diet soda. Pt does not limit fluid intake due to bladder control. Bowel: Frequency every couple days to diarrhea. Positive for pushing/straining with bowel movement, fecal incontinence, constipation, and incomplete emptying. Negative for pain with bowel movement. Pt does use pads for bowel protection; 4 pads per day (PPD). Use of stool softeners or laxatives?  Yes, fiber daily     Sexual: Pt is not sexually   Pelvic Organ Prolapse/Pelvic Pain: Location: some discomfort with bulging of rectocele     OB History: Number of pregnancies: 2 Number of vaginal deliveries: 2 Number of c-sections: 0. External Observations:  Voluntary contraction: [] absent     [x] present  Involuntary contraction: [] absent     [x] present  Involuntary relaxation: [] absent     [x] present        Pelvic Floor Muscle (PFM) Assessment:  Vaginal vault size: [] decreased     [] increased     [x] WNL  Muscle volume: [] decreased     [x] WNL     [] Defect  PFM tension: [] decreased     [x] increased     [] WNL     Contraction ability:  Voluntary contraction: [] absent     [x] weak     [] moderate      [] strong  Voluntary relaxation: [] absent     [x] partial     [] complete   MMT: 2/5   PFM endurance: 1 seconds   Overflow: [x] absent     [] min     [] mod     [] severe / Compensatory mm groups include   Levator Avulsion: [x] Negative  [] Positive              Palpation: via rectal canal   Superficial Pelvic Floor Musculature (PFM): Tender? Intermediate PFM Tender? Deep PFM Tender? Superficial Transverse Perineal [] Right  [] Left  []DNT Deep Transverse Perineal [] Right  [] Left  []DNT Puborectalis [x] Right  [x] Left  []DNT   Ischiocavernosus [] Right  [] Left  []DNT Compressor Urethra [] Right  [] Left  []DNT Pubococcygeus [x] Right  [x] Left  []DNT   Bulbocavernosus [] Right  [] Left  []DNT     Ileococcygeus [x] Right  [x] Left  []DNT           Obturator Internus [x] Right  [x] Left  []DNT           Coccygeus [] Right  [] Left  []DNT           Treatment   THERAPEUTIC EXERCISE: (25 minutes):    Exercises per grid below to improve mobility, strength, and coordination. Required minimal visual, verbal, manual, and tactile cues to promote proper body alignment, promote proper body posture, promote proper body mechanics, and promote proper body breathing techniques.   Progressed resistance, range, repetitions, and complexity of movement as indicated. Date:  8-18-22 Date:  8-25-22 Date:  9-8-22 Date:  9-20-22 Date:  9-27-22   Activity/Exercise Parameters Parameters Parameters     Patient Education Discussed HEP and POC  Reviewed to continue there ex at home     HEP Patient to continue HEP from therapy       Supine kegel with ball  x20      2nd position bridge  X20 yellow ball      Hooklying abduction with band  Blue band x 20      Seated march  X20 B        SLR with kegel  X20 B        Sit to stand  With table elevated x 20      Standing hip abduction with kegel  X20 B      Standing hip extension with kegel  X20 B      Sidestep in place  X20        Electrical Stimulation for muscle activation (rectal)   10 sec on 20 sec off, patient actively chuckie muscles 10 sec on 20 sec off, patient actively chuckie muscles 10 sec on 20 sec off, patient actively chuckie muscles      All exercises performed with emphasis on kegel and breathing in order improve coordination, awareness and to minimize symptoms. MANUAL THERAPY: ( minutes): to improve soft tissue tone    Date Type Location Comments   9/27/2022 Internal assessment/treatment                                         (Used abbreviations: MET - muscle energy technique; SCS- Strain counter strain; CTM-Connective tissue mobilizations; CR- Contract/Relax; SP- Sustained pressure; PIT- Positional inhibition techniques; STM Soft -tissue mobilization; MM- Myofascial mobilization; TrP-Trigger point release; IASTM- Instrument assisted soft tissue mobilizations, TDN-Trigger point dry needling)    Pt gives verbal consent to internal rectal assessment/treatment without chaperon present. NEURO REEDUCATION: () to improve control and coordination of pelvic floor     Date:   Date:   Date:     Activity/Exercise Parameters Parameters Parameters   Biofeedback With sEMG was utilized for coordination of PFM.                                                 THERAPEUTIC ACTIVITY: ( minutes):     TA Educational Topic Notes Date Completed   Pathology/Anatomy/PFM Function     Bladder health education     Urinary urge suppression     The knack     Voiding strategies     Nocturia tips     Bowel/Bladder log     Bowel health education     Constipation care     Diarrhea/Fecal leakage     Colonic massage     Toilet positioning     Defecation dynamics     Sources of fiber     Return to intercourse/Dilator training     Sexual positioning     Lubricants/vaginal moisturizers     Vaginal irritants     Body mechanics     Posture/ergonomics     Diaphragmatic breathing     Resources and technology          Treatment/Session Summary:    Treatment Assessment: Patient able to tolerate stim today. Possibly look into rectal/vaginal weights     Communication/Consultation:  None today  Equipment provided today:  None  Recommendations/Intent for next treatment session: Next visit will focus on electrical stimulation and strengthening of PFM. Total Treatment Billable Duration:  25 minutes there ex  Time In: 1100  Time Out: [address].  Tom Rosa, PT       Charge Capture  }Post Session Pain  PT Visit Info  [employer] Portal  MD Guidelines  Scanned Media  Benefits  MyChart    Future Appointments   Date Time Provider Ruddy Ewing   10/4/2022 11:00 AM Manasa Smith, PT [facility]

## 2022-09-27 ENCOUNTER — HOSPITAL ENCOUNTER (OUTPATIENT)
Dept: PHYSICAL THERAPY | Age: 42
Setting detail: RECURRING SERIES
Discharge: HOME | End: 2022-09-30
Payer: MEDICAID

## 2022-09-27 PROCEDURE — 97110 THERAPEUTIC EXERCISES: CPT

## 2022-10-04 ENCOUNTER — HOSPITAL ENCOUNTER (OUTPATIENT)
Dept: PHYSICAL THERAPY | Age: 42
Setting detail: RECURRING SERIES
Discharge: HOME | End: 2022-10-07
Payer: MEDICAID

## 2022-10-04 PROCEDURE — 97110 THERAPEUTIC EXERCISES: CPT

## 2022-10-04 NOTE — PROGRESS NOTES
began years ago. Patient was seen in pelvic PT back in the Spring of 2022. She has a history of bladder sling, hysterectomy, IBS, fibromyalgia, chronic neck/back pain, and CRPS. Patient had gone back to the doctor. Everything has been pretty much the same. She started new medications (levsin) she now fluctuates between constipation and diarrhea and not just diarrhea. This past week she is now going days without going. Nothing will come out. This morning a small amount came out. She does feel like something is there. She still has trouble pushing it out when its well formed. With the diarrhea she can't hold it. She feels oozy. If she is full in the rectum it puts pressure on her bladder and has more leakage. This past few weeks she is been having to splint more. She is trying to follow the [place]. She has lost some weight. She notices a difference if she doesn't eat as much greasy foods. She just went to pain management this past week and is now back on the patch. Urinary: Frequency 5-8 x/day, 1 x/night. Positive for urge urinary incontinence. Negative for stress urinary incontinence, urinary urgency, urinary frequency, incomplete emptying, urinary hesitancy/intermittency, dysuria, hematuria, nocturia, and nocturnal enuresis. Pt does use pads for urinary protection; 1 pads per day (PPD). Fluid intake: 8 glasses water/day; bladder irritants include: diet soda. Pt does not limit fluid intake due to bladder control. Bowel: Frequency every couple days to diarrhea. Positive for pushing/straining with bowel movement, fecal incontinence, constipation, and incomplete emptying. Negative for pain with bowel movement. Pt does use pads for bowel protection; 4 pads per day (PPD). Use of stool softeners or laxatives?  Yes, fiber daily     Sexual: Pt is not sexually   Pelvic Organ Prolapse/Pelvic Pain: Location: some discomfort with bulging of rectocele     OB History: Number of pregnancies: 2 Number of vaginal deliveries: 2 Number of c-sections: 0. External Observations:  Voluntary contraction: [] absent     [x] present  Involuntary contraction: [] absent     [x] present  Involuntary relaxation: [] absent     [x] present        Pelvic Floor Muscle (PFM) Assessment:  Vaginal vault size: [] decreased     [] increased     [x] WNL  Muscle volume: [] decreased     [x] WNL     [] Defect  PFM tension: [] decreased     [x] increased     [] WNL     Contraction ability:  Voluntary contraction: [] absent     [x] weak     [] moderate      [] strong  Voluntary relaxation: [] absent     [x] partial     [] complete   MMT: 2/5   PFM endurance: 1 seconds   Overflow: [x] absent     [] min     [] mod     [] severe / Compensatory mm groups include   Levator Avulsion: [x] Negative  [] Positive              Palpation: via rectal canal   Superficial Pelvic Floor Musculature (PFM): Tender? Intermediate PFM Tender? Deep PFM Tender? Superficial Transverse Perineal [] Right  [] Left  []DNT Deep Transverse Perineal [] Right  [] Left  []DNT Puborectalis [x] Right  [x] Left  []DNT   Ischiocavernosus [] Right  [] Left  []DNT Compressor Urethra [] Right  [] Left  []DNT Pubococcygeus [x] Right  [x] Left  []DNT   Bulbocavernosus [] Right  [] Left  []DNT     Ileococcygeus [x] Right  [x] Left  []DNT           Obturator Internus [x] Right  [x] Left  []DNT           Coccygeus [] Right  [] Left  []DNT           Treatment   THERAPEUTIC EXERCISE: (25 minutes):    Exercises per grid below to improve mobility, strength, and coordination. Required minimal visual, verbal, manual, and tactile cues to promote proper body alignment, promote proper body posture, promote proper body mechanics, and promote proper body breathing techniques. Progressed resistance, range, repetitions, and complexity of movement as indicated.    Date:  8-18-22 Date:  8-25-22 Date:  9-8-22 Date:  9-20-22 Date:  9-27-22 Date:  10-4-22   Activity/Exercise Parameters Parameters Parameters      Patient Education Discussed HEP and POC  Reviewed to continue there ex at home      HEP Patient to continue HEP from therapy        Supine kegel with ball  x20       2nd position bridge  X20 yellow ball       Hooklying abduction with band  Blue band x 20       Seated march  X20 B         SLR with kegel  X20 B         Sit to stand  With table elevated x 20       Standing hip abduction with kegel  X20 B       Standing hip extension with kegel  X20 B       Sidestep in place  X20         Electrical Stimulation for muscle activation (rectal)   10 sec on 20 sec off, patient actively chuckie muscles 10 sec on 20 sec off, patient actively chuckie muscles 10 sec on 20 sec off, patient actively chuckie muscles 10 sec on 20 sec off, patient actively chuckie muscles      All exercises performed with emphasis on kegel and breathing in order improve coordination, awareness and to minimize symptoms. MANUAL THERAPY: ( minutes): to improve soft tissue tone    Date Type Location Comments   10/4/2022 Internal assessment/treatment                                         (Used abbreviations: MET - muscle energy technique; SCS- Strain counter strain; CTM-Connective tissue mobilizations; CR- Contract/Relax; SP- Sustained pressure; PIT- Positional inhibition techniques; STM Soft -tissue mobilization; MM- Myofascial mobilization; TrP-Trigger point release; IASTM- Instrument assisted soft tissue mobilizations, TDN-Trigger point dry needling)    Pt gives verbal consent to internal rectal assessment/treatment without chaperon present. NEURO REEDUCATION: () to improve control and coordination of pelvic floor     Date:   Date:   Date:     Activity/Exercise Parameters Parameters Parameters   Biofeedback With sEMG was utilized for coordination of PFM.                                                 THERAPEUTIC ACTIVITY: ( minutes):     TA Educational Topic Notes Date Completed Pathology/Anatomy/PFM Function     Bladder health education     Urinary urge suppression     The knack     Voiding strategies     Nocturia tips     Bowel/Bladder log     Bowel health education     Constipation care     Diarrhea/Fecal leakage     Colonic massage     Toilet positioning     Defecation dynamics     Sources of fiber     Return to intercourse/Dilator training     Sexual positioning     Lubricants/vaginal moisturizers     Vaginal irritants     Body mechanics     Posture/ergonomics     Diaphragmatic breathing     Resources and technology          Treatment/Session Summary:    Treatment Assessment: Patient able to tolerate stim today. Communication/Consultation:  None today  Equipment provided today:  None  Recommendations/Intent for next treatment session: Next visit will focus on electrical stimulation and strengthening of PFM. Total Treatment Billable Duration:  25 minutes there ex       Everardo Hitflorencio.  Maxie Meigs, PT       Charge Capture  }Post Session Pain  PT Visit Info  [email or web address] Portal  MD Guidelines  Scanned Media  Benefits  MyChart    Future Appointments   Date Time Provider Ruddy Ewing   10/4/2022 11:00 AM Lubna Beavers, [facility]

## 2022-10-17 NOTE — PROGRESS NOTES
Kory Olea  : 1980  Primary: [address] [place] [address] Medicaid  Secondary:  [id number] @ [address] [address]  Phone: [phone]  Fax: [phone] Plan Frequency: one time a week for 90 days    Plan of Care/Certification Expiration Date: 22      PT Visit Info:  No data recorded   Visit Count:  7   OUTPATIENT PHYSICAL THERAPY:OP NOTE TYPE: Treatment Note 10/18/2022       Episode  }Appt Desk             Treatment Diagnosis:  Lack of coordination (muscle incoordination) (R27.8)  Pelvic floor dysfunction in female (M62.98)  Generalized weakness (M62.81)  Urge incontinence (N39.41)  Full incontinence of feces (Fecal incontinence NOS) (R15.9)  Medical/Referring Diagnosis:  Full incontinence of feces [R15.9]  Other specified disorders of muscle [M62.89]  Referring Physician:  Ulysses Humbles, PA MD Orders:  PT Eval and Treat   Date of Onset:  No data recorded   Allergies:   Ceftriaxone and Sulfamethoxazole-trimethoprim  Restrictions/Precautions:  No data recordedNo data recorded   Interventions Planned (Treatment may consist of any combination of the following):    Current Treatment Recommendations: Strengthening; ROM; ADL/Self-care training; Neuromuscular re-education; Manual Therapy - Soft Tissue Mobilization; Pain management; Home exercise program; Safety education & training; Patient/Caregiver education & training; Equipment evaluation, education, & procurement; Therapeutic activities     Subjective Comments: Patient reports she is about the same. Still has trouble going to the bathroom (bowel). She went back to the doctor. She is supposed to go back to two fiber pills a day. Still has trouble going. She feels like she has to go. Had some stool leakage yesterday.       Initial:}     /10Post Session:        /10  Medications Last Reviewed:  10/18/2022  Updated Objective Findings:   Ms. Feng Hartman is a 37 yo female referred to pelvic floor physical therapy (PFPT) by KIM Steel 2/2 Full incontinence of feces [R15.9]  Other specified disorders of muscle [M62.89] Pt reports that symptoms began years ago. Patient was seen in pelvic PT back in the Spring of 2022. She has a history of bladder sling, hysterectomy, IBS, fibromyalgia, chronic neck/back pain, and CRPS. Patient had gone back to the doctor. Everything has been pretty much the same. She started new medications (levsin) she now fluctuates between constipation and diarrhea and not just diarrhea. This past week she is now going days without going. Nothing will come out. This morning a small amount came out. She does feel like something is there. She still has trouble pushing it out when its well formed. With the diarrhea she can't hold it. She feels oozy. If she is full in the rectum it puts pressure on her bladder and has more leakage. This past few weeks she is been having to splint more. She is trying to follow the Low [place]. She has lost some weight. She notices a difference if she doesn't eat as much greasy foods. She just went to pain management this past week and is now back on the patch. Urinary: Frequency 5-8 x/day, 1 x/night. Positive for urge urinary incontinence. Negative for stress urinary incontinence, urinary urgency, urinary frequency, incomplete emptying, urinary hesitancy/intermittency, dysuria, hematuria, nocturia, and nocturnal enuresis. Pt does use pads for urinary protection; 1 pads per day (PPD). Fluid intake: 8 glasses water/day; bladder irritants include: diet soda. Pt does not limit fluid intake due to bladder control. Bowel: Frequency every couple days to diarrhea. Positive for pushing/straining with bowel movement, fecal incontinence, constipation, and incomplete emptying. Negative for pain with bowel movement. Pt does use pads for bowel protection; 4 pads per day (PPD). Use of stool softeners or laxatives?  Yes, fiber daily     Sexual: Pt is not sexually   Pelvic Organ Prolapse/Pelvic Pain: Location: some discomfort with bulging of rectocele     OB History: Number of pregnancies: 2 Number of vaginal deliveries: 2 Number of c-sections: 0. External Observations:  Voluntary contraction: [] absent     [x] present  Involuntary contraction: [] absent     [x] present  Involuntary relaxation: [] absent     [x] present        Pelvic Floor Muscle (PFM) Assessment:  Vaginal vault size: [] decreased     [] increased     [x] WNL  Muscle volume: [] decreased     [x] WNL     [] Defect  PFM tension: [] decreased     [x] increased     [] WNL     Contraction ability:  Voluntary contraction: [] absent     [x] weak     [] moderate      [] strong  Voluntary relaxation: [] absent     [x] partial     [] complete   MMT: 2/5   PFM endurance: 1 seconds   Overflow: [x] absent     [] min     [] mod     [] severe / Compensatory mm groups include   Levator Avulsion: [x] Negative  [] Positive              Palpation: via rectal canal   Superficial Pelvic Floor Musculature (PFM): Tender? Intermediate PFM Tender? Deep PFM Tender? Superficial Transverse Perineal [] Right  [] Left  []DNT Deep Transverse Perineal [] Right  [] Left  []DNT Puborectalis [x] Right  [x] Left  []DNT   Ischiocavernosus [] Right  [] Left  []DNT Compressor Urethra [] Right  [] Left  []DNT Pubococcygeus [x] Right  [x] Left  []DNT   Bulbocavernosus [] Right  [] Left  []DNT     Ileococcygeus [x] Right  [x] Left  []DNT           Obturator Internus [x] Right  [x] Left  []DNT           Coccygeus [] Right  [] Left  []DNT           Treatment   THERAPEUTIC EXERCISE: (25 minutes):    Exercises per grid below to improve mobility, strength, and coordination. Required minimal visual, verbal, manual, and tactile cues to promote proper body alignment, promote proper body posture, promote proper body mechanics, and promote proper body breathing techniques.   Progressed resistance, range, repetitions, and complexity of movement as indicated. Date:  8-18-22 Date:  8-25-22 Date:  9-8-22 Date:  9-20-22 Date:  9-27-22 Date:  10-4-22 Date:  10-18-22   Activity/Exercise Parameters Parameters Parameters       Patient Education Discussed HEP and POC  Reviewed to continue there ex at home       HEP Patient to continue HEP from therapy         Supine kegel with ball  x20        2nd position bridge  X20 yellow ball        Hooklying abduction with band  Blue band x 20        Seated march  X20 B          SLR with kegel  X20 B          Sit to stand  With table elevated x 20        Standing hip abduction with kegel  X20 B        Standing hip extension with kegel  X20 B        Sidestep in place  X20          Electrical Stimulation for muscle activation (rectal)   10 sec on 20 sec off, patient actively chuckie muscles 10 sec on 20 sec off, patient actively chuckie muscles 10 sec on 20 sec off, patient actively chuckie muscles 10 sec on 20 sec off, patient actively chuckie muscles 10 sec on 20 sec off, patient actively chuckie muscles      All exercises performed with emphasis on kegel and breathing in order improve coordination, awareness and to minimize symptoms. MANUAL THERAPY: ( minutes): to improve soft tissue tone    Date Type Location Comments   10/18/2022 Internal assessment/treatment                                         (Used abbreviations: MET - muscle energy technique; SCS- Strain counter strain; CTM-Connective tissue mobilizations; CR- Contract/Relax; SP- Sustained pressure; PIT- Positional inhibition techniques; STM Soft -tissue mobilization; MM- Myofascial mobilization; TrP-Trigger point release; IASTM- Instrument assisted soft tissue mobilizations, TDN-Trigger point dry needling)    Pt gives verbal consent to internal rectal assessment/treatment without chaperon present.     NEURO REEDUCATION: () to improve control and coordination of pelvic floor     Date:   Date:   Date: Activity/Exercise Parameters Parameters Parameters   Biofeedback With sEMG was utilized for coordination of PFM. THERAPEUTIC ACTIVITY: ( minutes):     TA Educational Topic Notes Date Completed   Pathology/Anatomy/PFM Function     Bladder health education     Urinary urge suppression     The knack     Voiding strategies     Nocturia tips     Bowel/Bladder log     Bowel health education     Constipation care     Diarrhea/Fecal leakage     Colonic massage     Toilet positioning     Defecation dynamics     Sources of fiber     Return to intercourse/Dilator training     Sexual positioning     Lubricants/vaginal moisturizers     Vaginal irritants     Body mechanics     Posture/ergonomics     Diaphragmatic breathing     Resources and technology          Treatment/Session Summary:    Treatment Assessment: Patient able to tolerate stim today. Will continue to progress as tolerated     Communication/Consultation:  None today  Equipment provided today:  None  Recommendations/Intent for next treatment session: Next visit will focus on electrical stimulation and strengthening of PFM. Total Treatment Billable Duration:  25 minutes there ex  Time In: 1000  Time Out: [address].  Ivan Citizen, PT       Charge Capture  }Post Session Pain  PT Visit Info  [facility]  MD Guidelines  Scanned Media  Benefits  MyChart    Future Appointments   Date Time Provider Ruddy Ewing   10/25/2022 10:00 AM [facility]   11/1/2022 11:00 AM [facility]   11/8/2022 11:00 AM Jhony Sessions, PT [facility]

## 2022-10-18 ENCOUNTER — HOSPITAL ENCOUNTER (OUTPATIENT)
Dept: PHYSICAL THERAPY | Age: 42
Setting detail: RECURRING SERIES
Discharge: HOME | End: 2022-10-21
Payer: MEDICAID

## 2022-10-18 PROCEDURE — 97110 THERAPEUTIC EXERCISES: CPT

## 2022-10-24 NOTE — PROGRESS NOTES
Dany Quintana  : 1980  Primary: [address] Medicaid  Secondary:  [address] @ [address] [address]  Phone: [phone]  Fax: [phone] Plan Frequency: one time a week for 90 days    Plan of Care/Certification Expiration Date: 22      PT Visit Info:  No data recorded   Visit Count:  8   OUTPATIENT PHYSICAL THERAPY:OP NOTE TYPE: Treatment Note 10/25/2022       Episode  }Appt Desk             Treatment Diagnosis:  Lack of coordination (muscle incoordination) (R27.8)  Pelvic floor dysfunction in female (M62.98)  Generalized weakness (M62.81)  Urge incontinence (N39.41)  Full incontinence of feces (Fecal incontinence NOS) (R15.9)  Medical/Referring Diagnosis:  Full incontinence of feces [R15.9]  Other specified disorders of muscle [M62.89]  Referring Physician:  KIM Mccann MD Orders:  PT Eval and Treat   Date of Onset:  No data recorded   Allergies:   Ceftriaxone and Sulfamethoxazole-trimethoprim  Restrictions/Precautions:  No data recordedNo data recorded   Interventions Planned (Treatment may consist of any combination of the following):    Current Treatment Recommendations: Strengthening; ROM; ADL/Self-care training; Neuromuscular re-education; Manual Therapy - Soft Tissue Mobilization; Pain management; Home exercise program; Safety education & training; Patient/Caregiver education & training; Equipment evaluation, education, & procurement; Therapeutic activities     Subjective Comments: Patient reports she is still having issues. She had a decent BM yesterday. This weekend she went to the bathroom on herself. It was a good bit. It was a mushy consistency. Went to splint and the biggest bulge was there. It was soft and a small piece came out.       Initial:}     /10Post Session:        /10  Medications Last Reviewed:  10/25/2022  Updated Objective Findings:   Ms. Nicole Smith is a 35 yo female referred to pelvic floor physical therapy (PFPT) by KIM Montero 2/2 Full incontinence of feces [R15.9]  Other specified disorders of muscle [M62.89] Pt reports that symptoms began years ago. Patient was seen in pelvic PT back in the Spring of 2022. She has a history of bladder sling, hysterectomy, IBS, fibromyalgia, chronic neck/back pain, and CRPS. Patient had gone back to the doctor. Everything has been pretty much the same. She started new medications (levsin) she now fluctuates between constipation and diarrhea and not just diarrhea. This past week she is now going days without going. Nothing will come out. This morning a small amount came out. She does feel like something is there. She still has trouble pushing it out when its well formed. With the diarrhea she can't hold it. She feels oozy. If she is full in the rectum it puts pressure on her bladder and has more leakage. This past few weeks she is been having to splint more. She is trying to follow the [place]. She has lost some weight. She notices a difference if she doesn't eat as much greasy foods. She just went to pain management this past week and is now back on the patch. Urinary: Frequency 5-8 x/day, 1 x/night. Positive for urge urinary incontinence. Negative for stress urinary incontinence, urinary urgency, urinary frequency, incomplete emptying, urinary hesitancy/intermittency, dysuria, hematuria, nocturia, and nocturnal enuresis. Pt does use pads for urinary protection; 1 pads per day (PPD). Fluid intake: 8 glasses water/day; bladder irritants include: diet soda. Pt does not limit fluid intake due to bladder control. Bowel: Frequency every couple days to diarrhea. Positive for pushing/straining with bowel movement, fecal incontinence, constipation, and incomplete emptying. Negative for pain with bowel movement. Pt does use pads for bowel protection; 4 pads per day (PPD). Use of stool softeners or laxatives?  Yes, fiber daily     Sexual: Pt is not sexually   Pelvic Organ Prolapse/Pelvic Pain: Location: some discomfort with bulging of rectocele     OB History: Number of pregnancies: 2 Number of vaginal deliveries: 2 Number of c-sections: 0. External Observations:  Voluntary contraction: [] absent     [x] present  Involuntary contraction: [] absent     [x] present  Involuntary relaxation: [] absent     [x] present        Pelvic Floor Muscle (PFM) Assessment:  Vaginal vault size: [] decreased     [] increased     [x] WNL  Muscle volume: [] decreased     [x] WNL     [] Defect  PFM tension: [] decreased     [x] increased     [] WNL     Contraction ability:  Voluntary contraction: [] absent     [x] weak     [] moderate      [] strong  Voluntary relaxation: [] absent     [x] partial     [] complete   MMT: 2/5   PFM endurance: 1 seconds   Overflow: [x] absent     [] min     [] mod     [] severe / Compensatory mm groups include   Levator Avulsion: [x] Negative  [] Positive              Palpation: via rectal canal   Superficial Pelvic Floor Musculature (PFM): Tender? Intermediate PFM Tender? Deep PFM Tender? Superficial Transverse Perineal [] Right  [] Left  []DNT Deep Transverse Perineal [] Right  [] Left  []DNT Puborectalis [x] Right  [x] Left  []DNT   Ischiocavernosus [] Right  [] Left  []DNT Compressor Urethra [] Right  [] Left  []DNT Pubococcygeus [x] Right  [x] Left  []DNT   Bulbocavernosus [] Right  [] Left  []DNT     Ileococcygeus [x] Right  [x] Left  []DNT           Obturator Internus [x] Right  [x] Left  []DNT           Coccygeus [] Right  [] Left  []DNT           Treatment   THERAPEUTIC EXERCISE: (25 minutes):    Exercises per grid below to improve mobility, strength, and coordination. Required minimal visual, verbal, manual, and tactile cues to promote proper body alignment, promote proper body posture, promote proper body mechanics, and promote proper body breathing techniques.   Progressed resistance, range, repetitions, and complexity of movement as indicated. Date:  8-18-22 Date:  8-25-22 Date:  9-8-22 Date:  9-20-22 Date:  9-27-22 Date:  10-4-22 Date:  10-18-22 Date:  10-25-22   Activity/Exercise Parameters Parameters Parameters        Patient Education Discussed HEP and POC  Reviewed to continue there ex at home        HEP Patient to continue HEP from therapy          Supine kegel with ball  x20         2nd position bridge  X20 yellow ball         Hooklying abduction with band  Blue band x 20         Seated march  X20 B           SLR with kegel  X20 B           Sit to stand  With table elevated x 20         Standing hip abduction with kegel  X20 B         Standing hip extension with kegel  X20 B         Sidestep in place  X20           Electrical Stimulation for muscle activation (rectal)   10 sec on 20 sec off, patient actively chuckie muscles 10 sec on 20 sec off, patient actively chuckie muscles 10 sec on 20 sec off, patient actively chuckie muscles 10 sec on 20 sec off, patient actively chuckie muscles 10 sec on 20 sec off, patient actively chuckie muscles 10 sec on 20 sec off, patient actively chuckie muscles      All exercises performed with emphasis on kegel and breathing in order improve coordination, awareness and to minimize symptoms. MANUAL THERAPY: ( minutes): to improve soft tissue tone    Date Type Location Comments   10/25/2022 Internal assessment/treatment                                         (Used abbreviations: MET - muscle energy technique; SCS- Strain counter strain; CTM-Connective tissue mobilizations; CR- Contract/Relax; SP- Sustained pressure; PIT- Positional inhibition techniques; STM Soft -tissue mobilization; MM- Myofascial mobilization; TrP-Trigger point release; IASTM- Instrument assisted soft tissue mobilizations, TDN-Trigger point dry needling)    Pt gives verbal consent to internal rectal assessment/treatment without chaperon present.     NEURO REEDUCATION: () to

## 2022-10-25 ENCOUNTER — HOSPITAL ENCOUNTER (OUTPATIENT)
Dept: PHYSICAL THERAPY | Age: 42
Setting detail: RECURRING SERIES
Discharge: HOME | End: 2022-10-28
Payer: MEDICAID

## 2022-10-25 PROCEDURE — 97110 THERAPEUTIC EXERCISES: CPT

## 2022-11-01 ENCOUNTER — HOSPITAL ENCOUNTER (OUTPATIENT)
Dept: PHYSICAL THERAPY | Age: 42
Setting detail: RECURRING SERIES
End: 2022-11-01
Payer: MEDICAID

## 2022-11-01 NOTE — PROGRESS NOTES
DATE: 11/1/2022    Patient canceled appointment less than 24 hours notice due to illness. Tiki Dominguez.  Prasad Gutierrez

## 2022-11-07 NOTE — PROGRESS NOTES
Liu Fregoso  : 1980  Primary: [address] [place] [address] Medicaid  Secondary:  [address] @ [address] [address]  Phone: [phone]  Fax: [phone] Plan Frequency: one time a week for 90 days    Plan of Care/Certification Expiration Date: 22      PT Visit Info:  No data recorded   Visit Count:  9   OUTPATIENT PHYSICAL THERAPY:OP NOTE TYPE: Treatment Note 2022       Episode  }Appt Desk             Treatment Diagnosis:  Lack of coordination (muscle incoordination) (R27.8)  Pelvic floor dysfunction in female (M62.98)  Generalized weakness (M62.81)  Urge incontinence (N39.41)  Full incontinence of feces (Fecal incontinence NOS) (R15.9)  Medical/Referring Diagnosis:  Full incontinence of feces [R15.9]  Other specified disorders of muscle [M62.89]  Referring Physician:  KIM Colon MD Orders:  PT Eval and Treat   Date of Onset:  No data recorded   Allergies:   Ceftriaxone and Sulfamethoxazole-trimethoprim  Restrictions/Precautions:  No data recordedNo data recorded   Interventions Planned (Treatment may consist of any combination of the following):    Current Treatment Recommendations: Strengthening; ROM; ADL/Self-care training; Neuromuscular re-education; Manual Therapy - Soft Tissue Mobilization; Pain management; Home exercise program; Safety education & training; Patient/Caregiver education & training; Equipment evaluation, education, & procurement; Therapeutic activities     Subjective Comments: Patient reports no changes. The past three  she has bowel incontinence. She is still constipated.       Initial:}     /10Post Session:        /10  Medications Last Reviewed:  2022  Updated Objective Findings:   Ms. Aria Herndon is a 37 yo female referred to pelvic floor physical therapy (PFPT) by KIM Colon 2/2 Full incontinence of feces [R15.9]  Other specified disorders of muscle [M62.89] Pt reports that symptoms began years ago. Patient was seen in pelvic PT back in the Spring of 2022. She has a history of bladder sling, hysterectomy, IBS, fibromyalgia, chronic neck/back pain, and CRPS. Patient had gone back to the doctor. Everything has been pretty much the same. She started new medications (levsin) she now fluctuates between constipation and diarrhea and not just diarrhea. This past week she is now going days without going. Nothing will come out. This morning a small amount came out. She does feel like something is there. She still has trouble pushing it out when its well formed. With the diarrhea she can't hold it. She feels oozy. If she is full in the rectum it puts pressure on her bladder and has more leakage. This past few weeks she is been having to splint more. She is trying to follow the Low [place]. She has lost some weight. She notices a difference if she doesn't eat as much greasy foods. She just went to pain management this past week and is now back on the patch. Urinary: Frequency 5-8 x/day, 1 x/night. Positive for urge urinary incontinence. Negative for stress urinary incontinence, urinary urgency, urinary frequency, incomplete emptying, urinary hesitancy/intermittency, dysuria, hematuria, nocturia, and nocturnal enuresis. Pt does use pads for urinary protection; 1 pads per day (PPD). Fluid intake: 8 glasses water/day; bladder irritants include: diet soda. Pt does not limit fluid intake due to bladder control. Bowel: Frequency every couple days to diarrhea. Positive for pushing/straining with bowel movement, fecal incontinence, constipation, and incomplete emptying. Negative for pain with bowel movement. Pt does use pads for bowel protection; 4 pads per day (PPD). Use of stool softeners or laxatives?  Yes, fiber daily     Sexual: Pt is not sexually   Pelvic Organ Prolapse/Pelvic Pain: Location: some discomfort with bulging of rectocele     OB History: Number of pregnancies: 2 Number of vaginal deliveries: 2 Number of c-sections: 0. External Observations:  Voluntary contraction: [] absent     [x] present  Involuntary contraction: [] absent     [x] present  Involuntary relaxation: [] absent     [x] present        Pelvic Floor Muscle (PFM) Assessment:  Vaginal vault size: [] decreased     [] increased     [x] WNL  Muscle volume: [] decreased     [x] WNL     [] Defect  PFM tension: [] decreased     [x] increased     [] WNL     Contraction ability:  Voluntary contraction: [] absent     [x] weak     [] moderate      [] strong  Voluntary relaxation: [] absent     [x] partial     [] complete   MMT: 2/5   PFM endurance: 1 seconds   Overflow: [x] absent     [] min     [] mod     [] severe / Compensatory mm groups include   Levator Avulsion: [x] Negative  [] Positive              Palpation: via rectal canal   Superficial Pelvic Floor Musculature (PFM): Tender? Intermediate PFM Tender? Deep PFM Tender? Superficial Transverse Perineal [] Right  [] Left  []DNT Deep Transverse Perineal [] Right  [] Left  []DNT Puborectalis [x] Right  [x] Left  []DNT   Ischiocavernosus [] Right  [] Left  []DNT Compressor Urethra [] Right  [] Left  []DNT Pubococcygeus [x] Right  [x] Left  []DNT   Bulbocavernosus [] Right  [] Left  []DNT     Ileococcygeus [x] Right  [x] Left  []DNT           Obturator Internus [x] Right  [x] Left  []DNT           Coccygeus [] Right  [] Left  []DNT           Treatment   THERAPEUTIC EXERCISE: (25 minutes):    Exercises per grid below to improve mobility, strength, and coordination. Required minimal visual, verbal, manual, and tactile cues to promote proper body alignment, promote proper body posture, promote proper body mechanics, and promote proper body breathing techniques. Progressed resistance, range, repetitions, and complexity of movement as indicated.    Date:  8-18-22 Date:  8-25-22 Date:  9-8-22 Date:  9-20-22 Date:  9-27-22 Date:  10-4-22 Date:  10-18-22 Date:  10-25-22 Date:  11-8-22   Activity/Exercise Parameters Parameters Parameters         Patient Education Discussed HEP and POC  Reviewed to continue there ex at home         HEP Patient to continue HEP from therapy           Supine kegel with ball  x20          2nd position bridge  X20 yellow ball          Hooklying abduction with band  Blue band x 20          Seated march  X20 B            SLR with kegel  X20 B            Sit to stand  With table elevated x 20          Standing hip abduction with kegel  X20 B          Standing hip extension with kegel  X20 B          Sidestep in place  X20            Electrical Stimulation for muscle activation (rectal)   10 sec on 20 sec off, patient actively chuckie muscles 10 sec on 20 sec off, patient actively chuckie muscles 10 sec on 20 sec off, patient actively chuckie muscles 10 sec on 20 sec off, patient actively chuckie muscles 10 sec on 20 sec off, patient actively chuckie muscles 10 sec on 20 sec off, patient actively chuckie muscles 10 sec on 20 sec off, patient actively chuckie muscles      All exercises performed with emphasis on kegel and breathing in order improve coordination, awareness and to minimize symptoms. MANUAL THERAPY: ( minutes): to improve soft tissue tone    Date Type Location Comments   11/8/2022 Internal assessment/treatment                                         (Used abbreviations: MET - muscle energy technique; SCS- Strain counter strain; CTM-Connective tissue mobilizations; CR- Contract/Relax; SP- Sustained pressure; PIT- Positional inhibition techniques; STM Soft -tissue mobilization; MM- Myofascial mobilization; TrP-Trigger point release; IASTM- Instrument assisted soft tissue mobilizations, TDN-Trigger point dry needling)    Pt gives verbal consent to internal rectal assessment/treatment without chaperon present.     NEURO REEDUCATION: () to improve control and coordination of pelvic floor     Date: Date:   Date:     Activity/Exercise Parameters Parameters Parameters   Biofeedback With sEMG was utilized for coordination of PFM. THERAPEUTIC ACTIVITY: ( minutes):     TA Educational Topic Notes Date Completed   Pathology/Anatomy/PFM Function     Bladder health education     Urinary urge suppression     The knack     Voiding strategies     Nocturia tips     Bowel/Bladder log     Bowel health education     Constipation care     Diarrhea/Fecal leakage     Colonic massage     Toilet positioning     Defecation dynamics     Sources of fiber     Return to intercourse/Dilator training     Sexual positioning     Lubricants/vaginal moisturizers     Vaginal irritants     Body mechanics     Posture/ergonomics     Diaphragmatic breathing     Resources and technology          Treatment/Session Summary:    Treatment Assessment: Patient continues to have deficits with pelvic floor muscle weakness and coordination. Communication/Consultation:  None today  Equipment provided today:  None  Recommendations/Intent for next treatment session: Next visit will focus on electrical stimulation and strengthening of PFM. Total Treatment Billable Duration:  25 minutes there ex  Time In: 1100  Time Out: [address]  Ronnie Hurd, PT       Charge Capture  }Post Session Pain  PT Visit Info  [facility] Portal  MD Guidelines  Scanned Media  Benefits  MyChart    Future Appointments   Date Time Provider Ruddy Ewing   11/17/2022 10:00 AM Addison Ronquillo, PT [facility]

## 2022-11-08 ENCOUNTER — HOSPITAL ENCOUNTER (OUTPATIENT)
Dept: PHYSICAL THERAPY | Age: 42
Setting detail: RECURRING SERIES
Discharge: HOME | End: 2022-11-11
Payer: MEDICAID

## 2022-11-08 PROCEDURE — 97110 THERAPEUTIC EXERCISES: CPT

## 2022-11-16 NOTE — PROGRESS NOTES
Viridiana Merrill  : 1980  Primary: [address] Medicaid  Secondary:  [address] @ [address] [address]  Phone: [phone]  Fax: [phone] Plan Frequency: one time a week for 90 days    Plan of Care/Certification Expiration Date: 22      PT Visit Info:  No data recorded   Visit Count:  10   OUTPATIENT PHYSICAL THERAPY:OP NOTE TYPE: Treatment Note 2022       Episode  }Appt Desk             Treatment Diagnosis:  Lack of coordination (muscle incoordination) (R27.8)  Pelvic floor dysfunction in female (M62.98)  Generalized weakness (M62.81)  Urge incontinence (N39.41)  Full incontinence of feces (Fecal incontinence NOS) (R15.9)  Medical/Referring Diagnosis:  Full incontinence of feces [R15.9]  Other specified disorders of muscle [M62.89]  Referring Physician:  KIM Cooney MD Orders:  PT Eval and Treat   Date of Onset:  No data recorded   Allergies:   Ceftriaxone and Sulfamethoxazole-trimethoprim  Restrictions/Precautions:  No data recordedNo data recorded   Interventions Planned (Treatment may consist of any combination of the following):    Current Treatment Recommendations: Strengthening; ROM; ADL/Self-care training; Neuromuscular re-education; Manual Therapy - Soft Tissue Mobilization; Pain management; Home exercise program; Safety education & training; Patient/Caregiver education & training; Equipment evaluation, education, & procurement; Therapeutic activities     Subjective Comments: Patient reports she went up on pain medicine as per pain management. She has been having bladder spasms for the past 3 days. Its not burning like a UTI. She felt something that she had to push out of the way. She had smearing two different times following therapy last week.       Initial:}     /10Post Session:        /10  Medications Last Reviewed:  2022  Updated Objective Findings:   Ms. Alissa Whelan is a 35 yo female referred to pelvic floor physical therapy (PFPT) by KIM Langford 2/2 Full incontinence of feces [R15.9]  Other specified disorders of muscle [M62.89] Pt reports that symptoms began years ago. Patient was seen in pelvic PT back in the Spring of 2022. She has a history of bladder sling, hysterectomy, IBS, fibromyalgia, chronic neck/back pain, and CRPS. Patient had gone back to the doctor. Everything has been pretty much the same. She started new medications (levsin) she now fluctuates between constipation and diarrhea and not just diarrhea. This past week she is now going days without going. Nothing will come out. This morning a small amount came out. She does feel like something is there. She still has trouble pushing it out when its well formed. With the diarrhea she can't hold it. She feels oozy. If she is full in the rectum it puts pressure on her bladder and has more leakage. This past few weeks she is been having to splint more. She is trying to follow the [place]. She has lost some weight. She notices a difference if she doesn't eat as much greasy foods. She just went to pain management this past week and is now back on the patch. Urinary: Frequency 5-8 x/day, 1 x/night. Positive for urge urinary incontinence. Negative for stress urinary incontinence, urinary urgency, urinary frequency, incomplete emptying, urinary hesitancy/intermittency, dysuria, hematuria, nocturia, and nocturnal enuresis. Pt does use pads for urinary protection; 1 pads per day (PPD). Fluid intake: 8 glasses water/day; bladder irritants include: diet soda. Pt does not limit fluid intake due to bladder control. Bowel: Frequency every couple days to diarrhea. Positive for pushing/straining with bowel movement, fecal incontinence, constipation, and incomplete emptying. Negative for pain with bowel movement. Pt does use pads for bowel protection; 4 pads per day (PPD).   Use of stool softeners or laxatives? Yes, fiber daily     Sexual: Pt is not sexually   Pelvic Organ Prolapse/Pelvic Pain: Location: some discomfort with bulging of rectocele     OB History: Number of pregnancies: 2 Number of vaginal deliveries: 2 Number of c-sections: 0. External Observations:  Voluntary contraction: [] absent     [x] present  Involuntary contraction: [] absent     [x] present  Involuntary relaxation: [] absent     [x] present        Pelvic Floor Muscle (PFM) Assessment:  Vaginal vault size: [] decreased     [] increased     [x] WNL  Muscle volume: [] decreased     [x] WNL     [] Defect  PFM tension: [] decreased     [x] increased     [] WNL     Contraction ability:  Voluntary contraction: [] absent     [x] weak     [] moderate      [] strong  Voluntary relaxation: [] absent     [x] partial     [] complete   MMT: 2/5   PFM endurance: 1 seconds   Overflow: [x] absent     [] min     [] mod     [] severe / Compensatory mm groups include   Levator Avulsion: [x] Negative  [] Positive              Palpation: via rectal canal   Superficial Pelvic Floor Musculature (PFM): Tender? Intermediate PFM Tender? Deep PFM Tender? Superficial Transverse Perineal [] Right  [] Left  []DNT Deep Transverse Perineal [] Right  [] Left  []DNT Puborectalis [x] Right  [x] Left  []DNT   Ischiocavernosus [] Right  [] Left  []DNT Compressor Urethra [] Right  [] Left  []DNT Pubococcygeus [x] Right  [x] Left  []DNT   Bulbocavernosus [] Right  [] Left  []DNT     Ileococcygeus [x] Right  [x] Left  []DNT           Obturator Internus [x] Right  [x] Left  []DNT           Coccygeus [] Right  [] Left  []DNT      Updated: 11-17-22  More tissue seen in vaginal canal. Vaginal palpation indicates increased tightness and pain to superficial muscles which is a new finding     Treatment   THERAPEUTIC EXERCISE: (0 minutes):    Exercises per grid below to improve mobility, strength, and coordination.   Required minimal visual, verbal, manual, and tactile cues to promote proper body alignment, promote proper body posture, promote proper body mechanics, and promote proper body breathing techniques. Progressed resistance, range, repetitions, and complexity of movement as indicated. Date:  8-18-22 Date:  8-25-22 Date:  9-8-22 Date:  9-20-22 Date:  9-27-22 Date:  10-4-22 Date:  10-18-22 Date:  10-25-22 Date:  11-8-22   Activity/Exercise Parameters Parameters Parameters         Patient Education Discussed HEP and POC  Reviewed to continue there ex at home         HEP Patient to continue HEP from therapy           Supine kegel with ball  x20          2nd position bridge  X20 yellow ball          Hooklying abduction with band  Blue band x 20          Seated march  X20 B            SLR with kegel  X20 B            Sit to stand  With table elevated x 20          Standing hip abduction with kegel  X20 B          Standing hip extension with kegel  X20 B          Sidestep in place  X20            Electrical Stimulation for muscle activation (rectal)   10 sec on 20 sec off, patient actively chuckie muscles 10 sec on 20 sec off, patient actively chuckie muscles 10 sec on 20 sec off, patient actively chuckie muscles 10 sec on 20 sec off, patient actively chuckie muscles 10 sec on 20 sec off, patient actively chuckie muscles 10 sec on 20 sec off, patient actively chuckie muscles 10 sec on 20 sec off, patient actively chuckie muscles      All exercises performed with emphasis on kegel and breathing in order improve coordination, awareness and to minimize symptoms.     MANUAL THERAPY: ( minutes): to improve soft tissue tone    Date Type Location Comments   11/17/2022 Internal assessment/treatment                                         (Used abbreviations: MET - muscle energy technique; SCS- Strain counter strain; CTM-Connective tissue mobilizations; CR- Contract/Relax; SP- Sustained pressure; PIT- Positional inhibition techniques; STM Soft -tissue mobilization; MM- Myofascial mobilization; TrP-Trigger point release; IASTM- Instrument assisted soft tissue mobilizations, TDN-Trigger point dry needling)    Pt gives verbal consent to internal rectal assessment/treatment without chaperon present. NEURO REEDUCATION: () to improve control and coordination of pelvic floor     Date:   Date:   Date:     Activity/Exercise Parameters Parameters Parameters   Biofeedback With sEMG was utilized for coordination of PFM. THERAPEUTIC ACTIVITY: (15 minutes):     TA Educational Topic Notes Date Completed   Pathology/Anatomy/PFM Function     Bladder health education     Urinary urge suppression     The knack     Voiding strategies     Nocturia tips     Bowel/Bladder log     Bowel health education     Constipation care     Diarrhea/Fecal leakage     Colonic massage     Toilet positioning     Defecation dynamics     Sources of fiber     Return to intercourse/Dilator training     Sexual positioning     Lubricants/vaginal moisturizers     Vaginal irritants     Body mechanics     Posture/ergonomics     Diaphragmatic breathing     Resources and technology     Other Discussed with patient findings from vaginal exam. Discussed going back to MD before returning to PT 11-17-22        Treatment/Session Summary:    Treatment Assessment: Patient came in reporting increased pressure and discomfort in vaginal canal. Reports of bladder like spasms and seeing more tissue in vaginal canal while on toilet. Examination showed more tissue in vaginal canal, tenderness and increased tightness to superficial PFM. Patient to return to MD before coming back to PT. Communication/Consultation:  None today  Equipment provided today:  None  Recommendations/Intent for next treatment session: Next visit will focus on electrical stimulation and strengthening of PFM.     Total Treatment Billable Duration:  15 minutes there act  Time In: 1000  Time Out: [address] Maxie Meigs, PT       Charge Capture  }Post Session Pain  PT Visit Info  [facility] Portal  MD Guidelines  Scanned Media  Benefits  MyChart    No future appointments.

## 2022-11-17 ENCOUNTER — HOSPITAL ENCOUNTER (OUTPATIENT)
Dept: PHYSICAL THERAPY | Age: 42
Setting detail: RECURRING SERIES
Discharge: HOME | End: 2022-11-20
Payer: MEDICAID

## 2022-11-17 PROCEDURE — 97530 THERAPEUTIC ACTIVITIES: CPT

## 2023-08-31 ENCOUNTER — HOSPITAL ENCOUNTER (OUTPATIENT)
Dept: PHYSICAL THERAPY | Age: 43
Setting detail: RECURRING SERIES
End: 2023-08-31
Payer: MEDICARE

## 2023-08-31 PROCEDURE — 97530 THERAPEUTIC ACTIVITIES: CPT

## 2023-08-31 PROCEDURE — 97162 PT EVAL MOD COMPLEX 30 MIN: CPT

## 2023-08-31 ASSESSMENT — PAIN SCALES - GENERAL: PAINLEVEL_OUTOF10: 3
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
## 2023-08-31 NOTE — PROGRESS NOTES
Melissa Alejandra  : 1980  Primary: Medicare Part A And B (Medicare)  Secondary: Prachi Pagan [address] [facility] [address]  Phone: [phone]  Fax: [phone] Plan Frequency: 1x/week  No data recorded    >PT Visit Info:  Plan Frequency: 1x/week      Visit Count:  1    OUTPATIENT PHYSICAL THERAPY:OP NOTE TYPE: OP Note Type: Treatment Note 2023       Episode  }Appt Desk           Treatment Diagnosis:  Lack of coordination (muscle incoordination) (R27.8)  Pelvic floor dysfunction in female (M62.98)  Urge incontinence (N39.41)  Muscle spasm (M62.83)  Levator spasm M62.838    Medical/Referring Diagnosis:  Levator spasm [V10.255]  Referring Physician:  Zenon Esquivel MD MD Orders:  PT Eval and Treat   Date of Onset:  No data recorded   Allergies:   Ceftriaxone and Sulfamethoxazole-trimethoprim  Restrictions/Precautions:  No data recordedNo data recorded     Interventions Planned (Treatment may consist of any combination of the following):    Current Treatment Recommendations: Strengthening; ROM; Neuromuscular re-education; Manual; Pain management; Home exercise program     >Subjective Comments: Defactory dysfunction, UI, pelvic pressure     >Initial:     3/10>Post Session:       3/10  Medications Last Reviewed:  2023  Updated Objective Findings:  See evaluation note from today  Treatment   THERAPEUTIC ACTIVITY: ( 15 minutes): Functional activity education regarding anatomy, pathology and role of pelvic floor muscle (PFM) function in relation to presenting symptoms and role of pelvic floor therapy in conservative treatment.  and Instruction on coordinated pelvic floor and diaphragmatic breathing to improve kinesthetic awareness of pelvic muscle mobility and restore proper motor recruitment patterns with breathing, posture, and functional movement (e.g. appropriate lift/contraction with increased IAP such as a cough, laugh, sneeze to

## 2023-09-12 ENCOUNTER — HOSPITAL ENCOUNTER (OUTPATIENT)
Dept: PHYSICAL THERAPY | Age: 43
Setting detail: RECURRING SERIES
Discharge: HOME | End: 2023-09-15
Payer: MEDICARE

## 2023-09-12 PROCEDURE — 97530 THERAPEUTIC ACTIVITIES: CPT

## 2023-09-12 PROCEDURE — 97110 THERAPEUTIC EXERCISES: CPT

## 2023-09-12 PROCEDURE — 97140 MANUAL THERAPY 1/> REGIONS: CPT

## 2023-09-12 ASSESSMENT — PAIN SCALES - GENERAL: PAINLEVEL_OUTOF10: 2

## 2023-09-12 ASSESSMENT — PAIN DESCRIPTION - LOCATION: LOCATION: PELVIS

## 2023-09-12 NOTE — PROGRESS NOTES
Jessie Ar  : 1980  Primary: Medicare Part A And B (Medicare)  Secondary: Christin Blackwell [place] [address]  [address] [facility] [address]  Phone: [phone]  Fax: [phone] Plan Frequency: 1x/week  No data recorded    >PT Visit Info:  Plan Frequency: 1x/week      Visit Count:  2    OUTPATIENT PHYSICAL THERAPY:OP NOTE TYPE: OP Note Type: Treatment Note 2023       Episode  }Appt Desk           Treatment Diagnosis:  Lack of coordination (muscle incoordination) (R27.8)  Pelvic floor dysfunction in female (M62.98)  Urge incontinence (N39.41)  Muscle spasm (M62.83)  Levator spasm M62.838    Medical/Referring Diagnosis:  Levator spasm [J44.005]  Referring Physician:  Leandro Rodrigues MD MD Orders:  PT Eval and Treat   Date of Onset:  No data recorded   Allergies:   Ceftriaxone and Sulfamethoxazole-trimethoprim  Restrictions/Precautions:  No data recordedNo data recorded     Interventions Planned (Treatment may consist of any combination of the following):    Current Treatment Recommendations: Strengthening; ROM; Neuromuscular re-education; Manual; Pain management; Home exercise program     >Subjective Comments: Defactory dysfunction, UI, pelvic pressure  Pt had a urinary accident this morning. This occurred right after she voided. She had an accident following a strong urge and was unable to pull her pants down. Morning fluids included 8 oz of almond milk and 8oz of water. Stool consistency described as Harper type 6. Describes a moderate amount of stool coming out at one time and then feeling she cannot fully evacuate. She is taking Linzess 1x/day. IF she reduces dosage of Linzess she describes getting backed up. History of Injury/Illness (Reason for Referral):  Ms. Taylor Cerspo is a 40 yo female referred to pelvic floor physical therapy (PFPT) by Leandro Rodrigues MD  due to  Levator spasm [M62.838].      Pt describes ongoing bowel and bladder

## 2023-09-18 ENCOUNTER — HOSPITAL ENCOUNTER (OUTPATIENT)
Dept: PHYSICAL THERAPY | Age: 43
Setting detail: RECURRING SERIES
End: 2023-09-18
Payer: MEDICARE

## 2023-09-18 NOTE — PROGRESS NOTES
Denilson Zavala  : 1980  Primary: Medicare Part A And B  Secondary: Jian Bustos @ [place] [id number]  Phone: [phone]  Fax: [phone]    PT Visit Info:    No data recorded   OT Visit Info:  No data recorded    OUTPATIENT THERAPY: 2023  Episode  Appt Desk        Denilson Zavala cancelled her appointment for today due to illness. Will plan to follow up next during next appointment.   Thank you,  Gayatri Lopez PT    Future Appointments   Date Time Provider [address] [place] [address]   2023 11:00 AM Gayatri Lopez [facility]   2023  1:00 PM Gayatri Lopez [facility]   10/2/2023 11:00 AM [facility]

## 2023-09-25 ENCOUNTER — HOSPITAL ENCOUNTER (OUTPATIENT)
Dept: PHYSICAL THERAPY | Age: 43
Setting detail: RECURRING SERIES
Discharge: HOME | End: 2023-09-28
Payer: MEDICARE

## 2023-09-25 PROCEDURE — 97110 THERAPEUTIC EXERCISES: CPT

## 2023-09-25 PROCEDURE — 97140 MANUAL THERAPY 1/> REGIONS: CPT

## 2023-09-25 ASSESSMENT — PAIN SCALES - GENERAL: PAINLEVEL_OUTOF10: 7

## 2023-09-25 NOTE — PROGRESS NOTES
Varinder Cotto  : 1980  Primary: Medicare Part A And B (Medicare)  Secondary: Alexandra Dandy [place] [address] [place] [address]  Phone: [phone]  Fax: [phone] Plan Frequency: 1x/week  No data recorded    >PT Visit Info:  Plan Frequency: 1x/week      Visit Count:  3    OUTPATIENT PHYSICAL THERAPY:OP NOTE TYPE: OP Note Type: Treatment Note 2023       Episode  }Appt Desk           Treatment Diagnosis:  Lack of coordination (muscle incoordination) (R27.8)  Pelvic floor dysfunction in female (M62.98)  Urge incontinence (N39.41)  Muscle spasm (M62.83)  Levator spasm M62.838    Medical/Referring Diagnosis:  Levator spasm [H01.668]  Referring Physician:  Oleg Duarte MD MD Orders:  PT Eval and Treat   Date of Onset:  No data recorded   Allergies:   Ceftriaxone and Sulfamethoxazole-trimethoprim  Restrictions/Precautions:  No data recordedNo data recorded     Interventions Planned (Treatment may consist of any combination of the following):    Current Treatment Recommendations: Strengthening; ROM; Neuromuscular re-education; Manual; Pain management; Home exercise program     >Subjective Comments: Defactory dysfunction, UI, pelvic pressure  23:   Urinary: Pt describes bladder spasms and nocturia (walking up hourly). Bladder spasms are not daily. These occur more frequently when she is unable to have BM. Bowel: Continues to take Linzess daily. She has had to take 4 colace 2 days in a row. States her stools are hard and she is unable to evacuate. Pt doing well with DB. She was unable to complete exercises at countertop due to inability to place pressure on her right arm/shoulder. Diagnosis of CRPS. She has had PT for her shoulder and sees pain management without relief in symptoms. She was also referred to vascular. Describes neural symptoms which radiate up to her neck and can lead to headaches.      Previous:  Pt had a urinary

## 2023-11-02 ENCOUNTER — HOSPITAL ENCOUNTER (OUTPATIENT)
Dept: PHYSICAL THERAPY | Age: 43
Setting detail: RECURRING SERIES
Discharge: HOME | End: 2023-11-05
Payer: MEDICARE

## 2023-11-02 PROCEDURE — 97110 THERAPEUTIC EXERCISES: CPT

## 2023-11-02 PROCEDURE — 97530 THERAPEUTIC ACTIVITIES: CPT

## 2023-11-02 ASSESSMENT — PAIN SCALES - GENERAL: PAINLEVEL_OUTOF10: 7

## 2023-11-02 NOTE — PROGRESS NOTES
Iris Santana  : 1980  Primary: Medicare Part A And B (Medicare)  [place] [address] [facility] [address]  Phone: [phone]  Fax: [phone] Plan Frequency: 1x/week  No data recorded    >PT Visit Info:  Plan Frequency: 1x/week      Visit Count:  4    OUTPATIENT PHYSICAL THERAPY:OP NOTE TYPE: OP Note Type: Treatment Note 2023       Episode  }Appt Desk           Treatment Diagnosis:  Lack of coordination (muscle incoordination) (R27.8)  Pelvic floor dysfunction in female (M62.98)  Urge incontinence (N39.41)  Muscle spasm (M62.83)  Levator spasm M62.838    Medical/Referring Diagnosis:  Levator spasm [U76.122]  Referring Physician:  Jennifer Fisher MD MD Orders:  PT Eval and Treat   Date of Onset:  No data recorded   Allergies:   Ceftriaxone and Sulfamethoxazole-trimethoprim  Restrictions/Precautions:  No data recordedNo data recorded     Interventions Planned (Treatment may consist of any combination of the following):    Current Treatment Recommendations: Strengthening; ROM; Neuromuscular re-education; Manual; Pain management; Home exercise program     >Subjective Comments: Defactory dysfunction, UI, pelvic pressure  23:  Pt completed 2 rounds of anti-biotics for a UTI. She had extreme pain and hematuria with a UTI. Pt is taking stool softeners because she was having anal tears. She also experiences loose stools and UFI. She is having difficulty managing this. Pt is seeing GI at [facility]. She does not have a scheduled f/u. She continues to take Linzess. Recently increased dosage with this. Having difficulty finding a balance. Pt experiencing global muscle pain today. Pain is rated as 7/10 currently. Bladder pressure increases with constipation. Pt had BM this morning. Still feels she could evacuate more. 23:   Urinary: Pt describes bladder spasms and nocturia (walking up hourly).  Bladder spasms

## 2023-11-09 ENCOUNTER — HOSPITAL ENCOUNTER (OUTPATIENT)
Dept: PHYSICAL THERAPY | Age: 43
Setting detail: RECURRING SERIES
Discharge: HOME | End: 2023-11-12
Payer: MEDICARE

## 2023-11-09 PROCEDURE — 97530 THERAPEUTIC ACTIVITIES: CPT

## 2023-11-09 PROCEDURE — 97110 THERAPEUTIC EXERCISES: CPT

## 2023-11-09 PROCEDURE — 97140 MANUAL THERAPY 1/> REGIONS: CPT

## 2023-11-09 ASSESSMENT — PAIN SCALES - GENERAL: PAINLEVEL_OUTOF10: 7

## 2023-11-09 NOTE — PROGRESS NOTES
Mike Valdez  : 1980  Primary: Medicare Part A And B (Medicare)  [place] [address] [facility] [address]  Phone: [phone]  Fax: [phone] Plan Frequency: 1x/week  No data recorded    >PT Visit Info:  Plan Frequency: 1x/week      Visit Count:  5    OUTPATIENT PHYSICAL THERAPY:OP NOTE TYPE: OP Note Type: Treatment Note 2023       Episode  }Appt Desk           Treatment Diagnosis:  Lack of coordination (muscle incoordination) (R27.8)  Pelvic floor dysfunction in female (M62.98)  Urge incontinence (N39.41)  Muscle spasm (M62.83)  Levator spasm M62.838    Medical/Referring Diagnosis:  Levator spasm [M79.683]  Referring Physician:  Annalisa Eason MD MD Orders:  PT Eval and Treat   Date of Onset:  No data recorded   Allergies:   Ceftriaxone and Sulfamethoxazole-trimethoprim  Restrictions/Precautions:  No data recordedNo data recorded     Interventions Planned (Treatment may consist of any combination of the following):    Current Treatment Recommendations: Strengthening; ROM; Neuromuscular re-education; Manual; Pain management; Home exercise program     >Subjective Comments: Defactory dysfunction, UI, pelvic pressure  23:  Pt has not had any urinary tract symptoms. GI: Difficulty having BM this morning. Described incomplete bowel evacuation. Positive for straining. Pt states she had an anal rectal tear last week. Using A&D to tears. Two days earlier, she describes watery stools and diarrhea. She continues to fluctuate between constipation and diarrhea. Intermittent abdominal discomfort. Pt continues to take Linzess (low dose). Pt also taking stool softeners. Following standing based exercises pt, states her shoulder and neck pain were significantly worse. She has seen rheumatology, vascular, PT, and now seeing pain management for her shoulder. Pt continues to have Botox injections for migraines.  States this

## 2023-11-16 ENCOUNTER — HOSPITAL ENCOUNTER (OUTPATIENT)
Dept: PHYSICAL THERAPY | Age: 43
Setting detail: RECURRING SERIES
Discharge: HOME | End: 2023-11-19
Payer: MEDICARE

## 2023-11-16 PROCEDURE — 97140 MANUAL THERAPY 1/> REGIONS: CPT

## 2023-11-16 PROCEDURE — 97110 THERAPEUTIC EXERCISES: CPT

## 2023-11-16 ASSESSMENT — PAIN SCALES - GENERAL: PAINLEVEL_OUTOF10: 7

## 2023-11-16 NOTE — PROGRESS NOTES
40 minutes  Time In: 0110  Time Out: 0151    Darinel Coleman, PT       Charge Capture  }Post Session Pain  PT Visit Info  [facility] Portal  MD Guidelines  Scanned Media  Benefits  MyChart    Future Appointments   Date Time Provider [address]   11/28/2023 11:00 AM [facility]

## 2023-11-28 ENCOUNTER — HOSPITAL ENCOUNTER (OUTPATIENT)
Dept: PHYSICAL THERAPY | Age: 43
Setting detail: RECURRING SERIES
Discharge: HOME | End: 2023-12-01
Payer: MEDICARE

## 2023-11-28 PROCEDURE — 97110 THERAPEUTIC EXERCISES: CPT

## 2023-11-28 PROCEDURE — 97530 THERAPEUTIC ACTIVITIES: CPT

## 2023-11-28 ASSESSMENT — PAIN SCALES - GENERAL: PAINLEVEL_OUTOF10: 6

## 2023-11-28 NOTE — THERAPY DISCHARGE
contract the pelvic floor muscles without breath holding, as assessed by digitally or with use of sEMG biofeedback, in order to improve bowel evacuation. MET  Patient will report minimal to no pain upon examination of the levator ani muscles and report minimal or no discomfort with bowel evacuation. NOT MET  Patient will improve outcome score by >12%. NOT MET  Patient will be independent with implementation of a timed voiding schedule and use of urge suppression to reduce urinary frequency to 5-8/day and 0-1/night. NOT MET  Patient will demonstrate independence with a home exercise program for aerobic conditioning, core stabilization, and general mobility for independent management of symptoms. NOT MET  Patient will demonstrate normal voluntary relaxation of the pelvic floor muscle group to improve pelvic floor ROM and tolerate pain free vaginal penetration. MET         Outcome Measure:   Pelvic Floor Impact Questionnaire--short form 7 (PFIQ-7):  Score:  Initial: 8/31/23  Bladder or Urine: 90/100  Bowel or Rectum: 90/100  Vagina or Pelvis: 81/100 Most Recent: X (Date: 11/28/23 )  Bladder or Urine: X/100  Bowel or Rectum: X/100  Vagina or Pelvis: X/100   Interpretation of Score: Each of the 7 sections is scored on a scale from 0-3; 0 representing \"Not at all\", 3 representing \"Quite a bit\". The mean value is taken from all the answered items, then multiplied by 100 to obtain the scale score, ranging from 0-100. This process is repeated for each column representing bowel, bladder, and pelvic pain. Regarding Wade Posey's therapy, I certify that the treatment plan above will be carried out by a therapist or under their direction.   Thank you for this referral,  Aries Dumas, PT     Referring Physician Signature: Luis Dillon MD _______________________________ Date _____________        Post Session Pain  Charge Capture  PT Visit Info MD Bar  MyChart

## 2023-11-28 NOTE — PROGRESS NOTES
techniques. Progressed range as indicated. Date:  9/12/23 Date:  9/25/23 Date:  11/2/23 Date:  11/16/23 Date:  11/28/23   Activity/Exercise Parameters Parameters Parameters Parameters Parameters   Quadruped rocking x 5 with PFM relaxation        Plantigrade rocking at countertop Medial, lateral pelvic sway       Supine PFM stretch 3 x 30 sec       Supine hip ER/IR  Legs supported against wall x 20      Supported PF stretch  Against wall 3 x 1 min      Supine pelvic tilts   AP direction 3 x 10  Verbal cues for relaxation shoulders AP direction 3 x 10  Verbal cues for relaxation shoulders    Supine lower trunk rotation    X 30 X 30     Supine hip IR/ER   X 30  X 30     Thoracic rotation   Standing x 10 each direction HELD TODAY    Cervical flexion   Supine 2 x 10   Held today    Hip IR with ball squeeze        HEP review for discharge and biofeedback training     sEMG biofeedback training - PF contract/relax, DB in supine position      Treatment/Session Summary:    >Treatment Assessment: See discharge summary  Communication/Consultation:  None today  Equipment provided today:  None  Recommendations/Intent for next treatment session: Next visit will focus on discharge with HEP and home management strategies  >Total Treatment Billable Duration: 40 minutes       Ralph Home, PT       Charge Capture  }Post Session Pain  PT Visit Info  [employer] Portal  MD Guidelines  Scanned Media  Benefits  MyChart    No future appointments.

## (undated) DEVICE — ZIMMER® STERILE DISPOSABLE TOURNIQUET CUFF WITH PLC, DUAL PORT, SINGLE BLADDER, 18 IN. (46 CM)

## (undated) DEVICE — 1.6 MM DRILL AND GUIDE FOR                                    DYNOMITE 2.0 MM SUTURE ANCHOR: Brand: DYNOMITE

## (undated) DEVICE — BANDAGE COMPR SELF ADH 5 YDX4 IN TAN STRL PREMIERPRO LF

## (undated) DEVICE — PADDING CAST COHESIVE 4 YDX3 IN HND TEARABLE COTTON SPEC 100

## (undated) DEVICE — AMD ANTIMICROBIAL GAUZE SPONGES,12 PLY USP TYPE VII, 0.2% POLYHEXAMETHYLENE BIGUANIDE HCI (PHMB): Brand: CURITY

## (undated) DEVICE — IMPLANTABLE DEVICE
Type: IMPLANTABLE DEVICE | Site: WRIST | Status: NON-FUNCTIONAL
Removed: 2019-10-02

## (undated) DEVICE — DRAPE, FILM SHEET, 44X65 STERILE: Brand: MEDLINE

## (undated) DEVICE — NEEDLE HYPO 25GA L1.5IN BLU POLYPR HUB S STL REG BVL STR

## (undated) DEVICE — SPLINT ORTH W3XL12IN LAYERED FBRGLS FOAM PD BRTH BK MOLD

## (undated) DEVICE — STRETCH BANDAGE ROLL: Brand: DERMACEA

## (undated) DEVICE — SOLUTION IV 1000ML 0.9% SOD CHL

## (undated) DEVICE — SUTURE MCRYL SZ 4-0 L27IN ABSRB UD L19MM PS-2 1/2 CIR PRIM Y426H

## (undated) DEVICE — PENCIL ES L10FT W/ HEX LOK BLDE ELECTRD DISP

## (undated) DEVICE — REM POLYHESIVE ADULT PATIENT RETURN ELECTRODE: Brand: VALLEYLAB

## (undated) DEVICE — CURITY NON-ADHERENT STRIPS: Brand: CURITY

## (undated) DEVICE — DRAPE,HAND,STERILE: Brand: MEDLINE

## (undated) DEVICE — SYR 10ML LUER LOK 1/5ML GRAD --

## (undated) DEVICE — SPLINT CAST W3XL35IN FBRGLS PD LTWT FAST SET UP SAFETYSPLNT

## (undated) DEVICE — (D)STRIP SKN CLSR 0.5X4IN WHT --

## (undated) DEVICE — STRIP,CLOSURE,WOUND,MEDI-STRIP,1/2X4: Brand: MEDLINE

## (undated) DEVICE — Device

## (undated) DEVICE — SURGICAL PROCEDURE PACK BASIC ST FRANCIS

## (undated) DEVICE — STERILE HOOK LOCK LATEX FREE ELASTIC BANDAGE 2INX5YD: Brand: HOOK LOCK™

## (undated) DEVICE — STOCKINETTE ORTH W9XL36IN COT 2 PLY HLLW FOR HANDLING LMB

## (undated) DEVICE — STERILE HOOK LOCK LATEX FREE ELASTIC BANDAGE 3INX5YD: Brand: HOOK LOCK™

## (undated) DEVICE — DRAPE XR C ARM 41X74IN LF --

## (undated) DEVICE — SLING ARM 2-37INX8-17IN PCH -- MED

## (undated) DEVICE — (D)PREP SKN CHLRAPRP APPL 26ML -- CONVERT TO ITEM 371833

## (undated) DEVICE — BNDG CMPR ELAS KNT VEL STD 4IN -- MEDICHOICE

## (undated) DEVICE — PADDING CAST W3INXL4YD NONSTERILE COT COHESIVE HND TEARABLE

## (undated) DEVICE — NDL FLTR TIP 5 MIC 18GX1.5IN --

## (undated) DEVICE — STERILE SLEEVE: Brand: CONVERTORS

## (undated) DEVICE — 3M™ TEGADERM™ TRANSPARENT FILM DRESSING FRAME STYLE, 1626W, 4 IN X 4-3/4 IN (10 CM X 12 CM), 50/CT 4CT/CASE: Brand: 3M™ TEGADERM™

## (undated) DEVICE — MASTISOL ADHESIVE LIQ 2/3ML

## (undated) DEVICE — SYRINGE MED 20ML STD CLR PLAS LUERLOCK TIP N CTRL DISP

## (undated) DEVICE — INTENDED FOR TISSUE SEPARATION, AND OTHER PROCEDURES THAT REQUIRE A SHARP SURGICAL BLADE TO PUNCTURE OR CUT.: Brand: BARD-PARKER ® STAINLESS STEEL BLADES

## (undated) DEVICE — APPLICATOR BNDG 1MM ADH PREMIERPRO EXOFIN

## (undated) DEVICE — SPLINT THMB W3XL12IN FBRGLS PD PRECUT LTWT DURABLE FAST SET

## (undated) DEVICE — SUTURE PDS II SZ 2-0 L27IN ABSRB VLT L26MM CT-2 1/2 CIR Z333H

## (undated) DEVICE — PADDING CAST W2INXL4YD ST COT COHESIVE HND TEARABLE SPEC

## (undated) DEVICE — BUTTON SWITCH PENCIL BLADE ELECTRODE, HOLSTER: Brand: EDGE

## (undated) DEVICE — KENDALL SCD EXPRESS SLEEVES, KNEE LENGTH, LARGE: Brand: KENDALL SCD

## (undated) DEVICE — 2000CC GUARDIAN II: Brand: GUARDIAN